# Patient Record
Sex: MALE | Race: WHITE | NOT HISPANIC OR LATINO
[De-identification: names, ages, dates, MRNs, and addresses within clinical notes are randomized per-mention and may not be internally consistent; named-entity substitution may affect disease eponyms.]

---

## 2019-11-26 ENCOUNTER — NON-APPOINTMENT (OUTPATIENT)
Age: 59
End: 2019-11-26

## 2019-11-26 ENCOUNTER — APPOINTMENT (OUTPATIENT)
Dept: HEART AND VASCULAR | Facility: CLINIC | Age: 59
End: 2019-11-26
Payer: COMMERCIAL

## 2019-11-26 ENCOUNTER — RESULT REVIEW (OUTPATIENT)
Age: 59
End: 2019-11-26

## 2019-11-26 VITALS
HEART RATE: 72 BPM | DIASTOLIC BLOOD PRESSURE: 94 MMHG | HEIGHT: 73 IN | SYSTOLIC BLOOD PRESSURE: 168 MMHG | BODY MASS INDEX: 29.82 KG/M2 | RESPIRATION RATE: 20 BRPM | WEIGHT: 225 LBS

## 2019-11-26 DIAGNOSIS — Z80.9 FAMILY HISTORY OF MALIGNANT NEOPLASM, UNSPECIFIED: ICD-10-CM

## 2019-11-26 DIAGNOSIS — Z84.1 FAMILY HISTORY OF DISORDERS OF KIDNEY AND URETER: ICD-10-CM

## 2019-11-26 DIAGNOSIS — E11.9 TYPE 2 DIABETES MELLITUS W/OUT COMPLICATIONS: ICD-10-CM

## 2019-11-26 DIAGNOSIS — Z82.49 FAMILY HISTORY OF ISCHEMIC HEART DISEASE AND OTHER DISEASES OF THE CIRCULATORY SYSTEM: ICD-10-CM

## 2019-11-26 DIAGNOSIS — Z78.9 OTHER SPECIFIED HEALTH STATUS: ICD-10-CM

## 2019-11-26 PROBLEM — Z00.00 ENCOUNTER FOR PREVENTIVE HEALTH EXAMINATION: Status: ACTIVE | Noted: 2019-11-26

## 2019-11-26 PROCEDURE — 0296T: CPT

## 2019-11-26 PROCEDURE — 93351 STRESS TTE COMPLETE: CPT

## 2019-11-26 PROCEDURE — 93320 DOPPLER ECHO COMPLETE: CPT

## 2019-11-26 PROCEDURE — 93000 ELECTROCARDIOGRAM COMPLETE: CPT | Mod: 59

## 2019-11-26 PROCEDURE — 93325 DOPPLER ECHO COLOR FLOW MAPG: CPT

## 2019-11-26 PROCEDURE — 99204 OFFICE O/P NEW MOD 45 MIN: CPT | Mod: 25

## 2019-11-26 PROCEDURE — 0298T: CPT

## 2019-11-26 RX ORDER — AMLODIPINE BESYLATE 10 MG/1
10 TABLET ORAL DAILY
Qty: 90 | Refills: 3 | Status: ACTIVE | COMMUNITY

## 2019-11-26 NOTE — DISCUSSION/SUMMARY
[Hypertension] : hypertension [FreeTextEntry1] : 59 year old M with history of type 2 DM, HTN, HL, right retina vein occlusion, with transient visual loss in left eye - with extensive cotton wool spots. Occasional palpitations. Abnormal EKG. \par - Stress echo with bubble study. \par - 2 week telemetry monitor\par - Start Atorvastatin 40 mg PO daily\par - A1C 9.0 as per labs 2 weeks ago - needs Endocrinologist referral. \par - Spoke with Dr. Mccarthy, and to get Brain MRI as well. \par - BP mildly elevated - asked patient to keep BP log; continue taking Amlodipine 10 mg PO daily and Enalapril 10 mg PO BID. (did not take meds this morning)\par -  Return after testing completed.

## 2019-11-26 NOTE — PHYSICAL EXAM
[General Appearance - Well Developed] : well developed [Normal Appearance] : normal appearance [General Appearance - Well Nourished] : well nourished [Well Groomed] : well groomed [No Deformities] : no deformities [General Appearance - In No Acute Distress] : no acute distress [Normal Oral Mucosa] : normal oral mucosa [No Oral Pallor] : no oral pallor [No Oral Cyanosis] : no oral cyanosis [Heart Rate And Rhythm] : heart rate and rhythm were normal [Heart Sounds] : normal S1 and S2 [Respiration, Rhythm And Depth] : normal respiratory rhythm and effort [Murmurs] : no murmurs present [Exaggerated Use Of Accessory Muscles For Inspiration] : no accessory muscle use [Auscultation Breath Sounds / Voice Sounds] : lungs were clear to auscultation bilaterally [Abdomen Soft] : soft [Abdomen Tenderness] : non-tender [Abdomen Mass (___ Cm)] : no abdominal mass palpated [Nail Clubbing] : no clubbing of the fingernails [Cyanosis, Localized] : no localized cyanosis [] : no ischemic changes [Petechial Hemorrhages (___cm)] : no petechial hemorrhages [Oriented To Time, Place, And Person] : oriented to person, place, and time [Affect] : the affect was normal [Mood] : the mood was normal [No Anxiety] : not feeling anxious

## 2019-11-26 NOTE — REASON FOR VISIT
[Initial Evaluation] : an initial evaluation of [FreeTextEntry1] : 59 year old M with history of type 2 DM, HTN, HL, right retina vein occlusion, with complaints of left eye spots. Seeing Dr. Starr Mccarthy from [facility]. 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 Was fine until recently 2 weeks ago when he started having transient visual loss in the right eye. He went to see Optho yesterday and was found to have extensive cotton wool spots. He was referred here for cardiac evaluation and to rule out embolic phenomenon.  \par \par Labs Oct 11 2019:\par Total chol 186; TG 67; HDL 43; ; TSH 1.140; CBC/CMP WNL; ; A1C 9.0\par \par Strong family history of CAD - father with CABG\par \par Carotid Artery Duplex: No significant disease noted bilaterally. \par \par EKG NSR at 82 bpm with T wave inv in V1, V2, V2, V4-V6.

## 2019-12-04 ENCOUNTER — RECORD ABSTRACTING (OUTPATIENT)
Age: 59
End: 2019-12-04

## 2019-12-04 ENCOUNTER — RESULT REVIEW (OUTPATIENT)
Age: 59
End: 2019-12-04

## 2019-12-04 DIAGNOSIS — G45.9 TRANSIENT CEREBRAL ISCHEMIC ATTACK, UNSPECIFIED: ICD-10-CM

## 2019-12-05 ENCOUNTER — APPOINTMENT (OUTPATIENT)
Dept: HEART AND VASCULAR | Facility: CLINIC | Age: 59
End: 2019-12-05

## 2019-12-31 ENCOUNTER — RX RENEWAL (OUTPATIENT)
Age: 59
End: 2019-12-31

## 2020-01-07 ENCOUNTER — APPOINTMENT (OUTPATIENT)
Dept: HEART AND VASCULAR | Facility: CLINIC | Age: 60
End: 2020-01-07
Payer: COMMERCIAL

## 2020-01-07 VITALS
WEIGHT: 227 LBS | HEIGHT: 73 IN | SYSTOLIC BLOOD PRESSURE: 162 MMHG | HEART RATE: 84 BPM | BODY MASS INDEX: 30.09 KG/M2 | RESPIRATION RATE: 20 BRPM | DIASTOLIC BLOOD PRESSURE: 84 MMHG

## 2020-01-07 PROCEDURE — 99214 OFFICE O/P EST MOD 30 MIN: CPT

## 2020-01-07 RX ORDER — ENALAPRIL MALEATE 10 MG/1
10 TABLET ORAL TWICE DAILY
Refills: 0 | Status: DISCONTINUED | COMMUNITY
End: 2020-01-07

## 2020-01-09 NOTE — DISCUSSION/SUMMARY
[Hypertension] : hypertension [FreeTextEntry1] : 59 year old M with history of type 2 DM, HTN, HL, right retina vein occlusion, with transient visual loss in left eye - with extensive cotton wool spots. Occasional palpitations. Ziopatch 1 week - SR with APC/PVC. Echo normal. Stress normal. Aortic arch plaque. \par \par - On Atorvastatin 40 mg PO daily\par - A1C 9.0 as per labs 2 weeks ago - to see Endocrinologist.\par - seeing Dr. Mccarthy, for Opthalmology  \par - BP  elevated - continue patient to keep BP log; continue taking Amlodipine 10 mg PO daily and  change Enalapril to 20 mg PO once daily. Add Bystolic 5 mg PO daily. \par -  Return in 3 months with BP log

## 2020-01-09 NOTE — REASON FOR VISIT
[Initial Evaluation] : an initial evaluation of [FreeTextEntry1] : 59 year old M with history of type 2 DM, HTN, HL, right retina vein occlusion here for followup after extensive cardiac workup, as per Opthomalogoy. Still with vision problems bilateral. Bp remains elevated at  home - as per patient - BP systolic is 150's-170's mm Hg. No chest pain, dyspnea, orthopnea, PND, edema. \par \par Stress echo: Chuck protocol 9:50 min; No EKG changes. no WMA on post exercise. \par Resting echo: Mild MR; LVEF 60%; PASP 23 mm HG. Trace TR. Borderline cLVH. \par \par MANJU Dec 5 2019 - no thrombus noted; No PFO, negative bubble study. Trace MR/TR/HI. Normal LVEF. \par Ziopatch - 11/26/2109-12/03/2019 - SR with Rare APC/PVC. No arrhythmias\par \par Labs Oct 11 2019:\par Total chol 186; TG 67; HDL 43; ; TSH 1.140; CBC/CMP WNL; ; A1C 9.0\par \par Strong family history of CAD - father with CABG\par \par Carotid Artery Duplex: No significant disease noted bilaterally. \par \par EKG NSR at 82 bpm with T wave inv in V1, V2, V2, V4-V6.

## 2020-03-03 ENCOUNTER — APPOINTMENT (OUTPATIENT)
Dept: HEART AND VASCULAR | Facility: CLINIC | Age: 60
End: 2020-03-03
Payer: COMMERCIAL

## 2020-03-03 PROCEDURE — 93880 EXTRACRANIAL BILAT STUDY: CPT

## 2020-04-07 ENCOUNTER — APPOINTMENT (OUTPATIENT)
Dept: HEART AND VASCULAR | Facility: CLINIC | Age: 60
End: 2020-04-07

## 2020-05-25 ENCOUNTER — LABORATORY RESULT (OUTPATIENT)
Age: 60
End: 2020-05-25

## 2020-05-26 ENCOUNTER — LABORATORY RESULT (OUTPATIENT)
Age: 60
End: 2020-05-26

## 2020-05-26 ENCOUNTER — APPOINTMENT (OUTPATIENT)
Dept: HEART AND VASCULAR | Facility: CLINIC | Age: 60
End: 2020-05-26

## 2020-05-26 ENCOUNTER — APPOINTMENT (OUTPATIENT)
Dept: HEART AND VASCULAR | Facility: CLINIC | Age: 60
End: 2020-05-26
Payer: COMMERCIAL

## 2020-05-26 VITALS
RESPIRATION RATE: 16 BRPM | HEIGHT: 73 IN | OXYGEN SATURATION: 98 % | TEMPERATURE: 97.8 F | DIASTOLIC BLOOD PRESSURE: 70 MMHG | HEART RATE: 84 BPM | SYSTOLIC BLOOD PRESSURE: 116 MMHG | BODY MASS INDEX: 29.42 KG/M2 | WEIGHT: 222 LBS

## 2020-05-26 DIAGNOSIS — Z86.69 PERSONAL HISTORY OF OTHER DISEASES OF THE NERVOUS SYSTEM AND SENSE ORGANS: ICD-10-CM

## 2020-05-26 PROCEDURE — 99215 OFFICE O/P EST HI 40 MIN: CPT

## 2020-05-26 NOTE — DISCUSSION/SUMMARY
[Hyperlipidemia] : hyperlipidemia [Diet Modification] : diet modification [Exercise] : exercise [Hypertension] : hypertension [Improving] : improving [Responding to Treatment] : responding to treatment [Exercise Regimen] : an exercise regimen [Weight Loss] : weight loss [Mild Mitral Regurgitation] : mild mitral regurgitation [Compensated] : compensated [Sodium Restriction] : sodium restriction [Peripheral Vascular Disease] : peripheral vascular disease [Stable] : stable [Exercise Rehab] : exercise rehabilitation [Deteriorating] : deteriorating [None] : none [Patient] : the patient [de-identified] : GREENE [de-identified] : inc CIMT b/l; mild carotid plaque b/l; aorta atherosclerosis

## 2020-05-26 NOTE — REVIEW OF SYSTEMS
[Headache] : no headache [Fever] : no fever [Feeling Fatigued] : not feeling fatigued [Chills] : no chills [Blurry Vision] : blurred vision [Eyeglasses] : not currently wearing eyeglasses [Seeing Double (Diplopia)] : no diplopia [Eye Pain] : eye pain [Negative] : Heme/Lymph

## 2020-05-26 NOTE — REASON FOR VISIT
[Follow-Up - Clinic] : a clinic follow-up of [Hyperlipidemia] : hyperlipidemia [Dyspnea] : dyspnea [Hypertension] : hypertension [Mitral Regurgitation] : mitral regurgitation [Peripheral Vascular Disease] : peripheral vascular disease

## 2020-05-26 NOTE — PHYSICAL EXAM
[Normal Appearance] : normal appearance [General Appearance - Well Developed] : well developed [General Appearance - Well Nourished] : well nourished [Well Groomed] : well groomed [No Deformities] : no deformities [General Appearance - In No Acute Distress] : no acute distress [Eyelids - No Xanthelasma] : the eyelids demonstrated no xanthelasmas [Normal Conjunctiva] : the conjunctiva exhibited no abnormalities [Normal Oral Mucosa] : normal oral mucosa [No Oral Pallor] : no oral pallor [No Oral Cyanosis] : no oral cyanosis [Normal Jugular Venous A Waves Present] : normal jugular venous A waves present [Normal Jugular Venous V Waves Present] : normal jugular venous V waves present [No Jugular Venous Melendez A Waves] : no jugular venous melendez A waves [Respiration, Rhythm And Depth] : normal respiratory rhythm and effort [Auscultation Breath Sounds / Voice Sounds] : lungs were clear to auscultation bilaterally [Exaggerated Use Of Accessory Muscles For Inspiration] : no accessory muscle use [Heart Rate And Rhythm] : heart rate and rhythm were normal [Heart Sounds] : normal S1 and S2 [FreeTextEntry1] : sys murmur [Abdomen Soft] : soft [Abdomen Tenderness] : non-tender [Abdomen Mass (___ Cm)] : no abdominal mass palpated [Abnormal Walk] : normal gait [Nail Clubbing] : no clubbing of the fingernails [Gait - Sufficient For Exercise Testing] : the gait was sufficient for exercise testing [Cyanosis, Localized] : no localized cyanosis [Petechial Hemorrhages (___cm)] : no petechial hemorrhages [Skin Color & Pigmentation] : normal skin color and pigmentation [] : no rash [No Venous Stasis] : no venous stasis [Skin Lesions] : no skin lesions [No Skin Ulcers] : no skin ulcer [No Xanthoma] : no  xanthoma was observed

## 2020-05-26 NOTE — HISTORY OF PRESENT ILLNESS
[FreeTextEntry1] : Jeramie Zambrano returns for follow up.  He has been seen by Dr Parks in the past.  \par \par Today, he denies cp.  He has GREENE since he has not been exercising consistently.  He denies pnd, orthopnea, edema, palp, or loc.\par \par He continues to have visual disturbance in his L eye (improved some post glaucoma surgery).  \par \par Recent eval with endocrinologist completed.\par \par Clinical hx reviewed in detail.\par \par Recommendations:\par 1. blood work to assess inflammatory markers, coagulopathy\par 2. collect all records from several ophtho evals and procedures\par 3. collect all brain imaging studies (CT, MRI/MRA)\par 4. get CTA\par 5. consider more aggressive antiplatelet regimen vs DOAC\par 6. consider loop recorder\par 7. aggressive CV risk interventions

## 2020-05-27 LAB
ANION GAP SERPL CALC-SCNC: 20 MMOL/L
BUN SERPL-MCNC: 20 MG/DL
CALCIUM SERPL-MCNC: 9.8 MG/DL
CHLORIDE SERPL-SCNC: 98 MMOL/L
CO2 SERPL-SCNC: 22 MMOL/L
CREAT SERPL-MCNC: 1.01 MG/DL
CRP SERPL HS-MCNC: 0.74 MG/L
CRP SERPL-MCNC: <0.1 MG/DL
DEPRECATED D DIMER PPP IA-ACNC: <150 NG/ML DDU
GLUCOSE SERPL-MCNC: 151 MG/DL
POTASSIUM SERPL-SCNC: 4.2 MMOL/L
SODIUM SERPL-SCNC: 140 MMOL/L

## 2020-05-28 RX ORDER — ENALAPRIL MALEATE 10 MG/1
10 TABLET ORAL DAILY
Refills: 0 | Status: ACTIVE | COMMUNITY

## 2020-05-28 RX ORDER — ENALAPRIL MALEATE 20 MG/1
20 TABLET ORAL DAILY
Qty: 60 | Refills: 0 | Status: DISCONTINUED | COMMUNITY
Start: 2020-01-07 | End: 2020-05-28

## 2020-05-28 RX ORDER — NEBIVOLOL HYDROCHLORIDE 5 MG/1
5 TABLET ORAL DAILY
Qty: 90 | Refills: 3 | Status: DISCONTINUED | COMMUNITY
Start: 2020-01-07 | End: 2020-05-28

## 2020-05-29 LAB
MYELOPEROXIDASE AB SER QL IA: <5 UNITS
MYELOPEROXIDASE CELLS FLD QL: NEGATIVE

## 2020-05-29 RX ORDER — NETARSUDIL AND LATANOPROST OPHTHALMIC SOLUTION, 0.02%/0.005% .2; .05 MG/ML; MG/ML
0.02-0.005 SOLUTION/ DROPS OPHTHALMIC; TOPICAL
Refills: 0 | Status: ACTIVE | COMMUNITY

## 2020-06-01 ENCOUNTER — RESULT REVIEW (OUTPATIENT)
Age: 60
End: 2020-06-01

## 2021-01-11 ENCOUNTER — RX RENEWAL (OUTPATIENT)
Age: 61
End: 2021-01-11

## 2021-05-07 ENCOUNTER — APPOINTMENT (OUTPATIENT)
Dept: INTERNAL MEDICINE | Facility: CLINIC | Age: 61
End: 2021-05-07
Payer: COMMERCIAL

## 2021-05-07 VITALS
BODY MASS INDEX: 28.89 KG/M2 | OXYGEN SATURATION: 96 % | HEART RATE: 73 BPM | TEMPERATURE: 97.3 F | HEIGHT: 73 IN | DIASTOLIC BLOOD PRESSURE: 100 MMHG | WEIGHT: 218 LBS | SYSTOLIC BLOOD PRESSURE: 150 MMHG

## 2021-05-07 DIAGNOSIS — G47.10 HYPERSOMNIA, UNSPECIFIED: ICD-10-CM

## 2021-05-07 PROCEDURE — 99203 OFFICE O/P NEW LOW 30 MIN: CPT

## 2021-05-07 PROCEDURE — 99072 ADDL SUPL MATRL&STAF TM PHE: CPT

## 2021-05-07 NOTE — PLAN
[FreeTextEntry1] : Patient reassured that his current sleep pattern is not causing deterioration of his visual problems appear

## 2021-05-07 NOTE — HISTORY OF PRESENT ILLNESS
[FreeTextEntry1] : Evaluation of sleep problems. [de-identified] : This is a 61-year-old male with history of diabetes, hypertension, dyslipidemia, diabetic eye disease with retinal vein occlusion. Patient is currently asking me if his sleep problem is in any way affecting his visual problem. 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 He sleeps from 9 AM to 12 no. He then goes back to sleep from 6 PM to 9 PM before going to work. This been going on for 30 years. He feels slightly sleepy in general but he is able to work without any difficulty. On the weekends he sleeps from 9 PM to 5 AM and may feel slightly better on the weekends. There is no snoring or witnessed apneas. He states he does not have sleep apnea. He wants to know if he could sleep more would his visual problem get better.

## 2021-05-24 ENCOUNTER — APPOINTMENT (OUTPATIENT)
Dept: HEART AND VASCULAR | Facility: CLINIC | Age: 61
End: 2021-05-24
Payer: COMMERCIAL

## 2021-05-24 ENCOUNTER — NON-APPOINTMENT (OUTPATIENT)
Age: 61
End: 2021-05-24

## 2021-05-24 VITALS
DIASTOLIC BLOOD PRESSURE: 90 MMHG | RESPIRATION RATE: 16 BRPM | OXYGEN SATURATION: 98 % | HEIGHT: 73 IN | BODY MASS INDEX: 29.82 KG/M2 | WEIGHT: 225 LBS | HEART RATE: 76 BPM | SYSTOLIC BLOOD PRESSURE: 152 MMHG | TEMPERATURE: 97.6 F

## 2021-05-24 PROCEDURE — 93000 ELECTROCARDIOGRAM COMPLETE: CPT

## 2021-05-24 PROCEDURE — 99215 OFFICE O/P EST HI 40 MIN: CPT

## 2021-05-24 PROCEDURE — 99072 ADDL SUPL MATRL&STAF TM PHE: CPT

## 2021-05-24 RX ORDER — DORZOLAMIDE HYDROCHLORIDE 20 MG/ML
SOLUTION OPHTHALMIC
Refills: 0 | Status: ACTIVE | COMMUNITY

## 2021-05-24 RX ORDER — HYDROCHLOROTHIAZIDE 25 MG/1
25 TABLET ORAL DAILY
Qty: 90 | Refills: 3 | Status: DISCONTINUED | COMMUNITY
End: 2021-05-24

## 2021-05-24 RX ORDER — METOPROLOL SUCCINATE 100 MG/1
100 TABLET, EXTENDED RELEASE ORAL
Qty: 2 | Refills: 0 | Status: DISCONTINUED | COMMUNITY
Start: 2020-05-28 | End: 2021-05-24

## 2021-05-24 RX ORDER — BRIMONIDINE TARTRATE, TIMOLOL MALEATE 2; 5 MG/ML; MG/ML
0.2-0.5 SOLUTION/ DROPS OPHTHALMIC
Refills: 0 | Status: DISCONTINUED | COMMUNITY
End: 2021-05-24

## 2021-05-24 RX ORDER — PREDNISOLONE ACETATE 10 MG/ML
1 SUSPENSION/ DROPS OPHTHALMIC
Refills: 0 | Status: DISCONTINUED | COMMUNITY
End: 2021-05-24

## 2021-05-24 NOTE — PHYSICAL EXAM
[General Appearance - Well Developed] : well developed [Normal Appearance] : normal appearance [Well Groomed] : well groomed [General Appearance - Well Nourished] : well nourished [No Deformities] : no deformities [General Appearance - In No Acute Distress] : no acute distress [Normal Conjunctiva] : the conjunctiva exhibited no abnormalities [Eyelids - No Xanthelasma] : the eyelids demonstrated no xanthelasmas [Normal Oral Mucosa] : normal oral mucosa [No Oral Pallor] : no oral pallor [No Oral Cyanosis] : no oral cyanosis [Normal Jugular Venous A Waves Present] : normal jugular venous A waves present [Normal Jugular Venous V Waves Present] : normal jugular venous V waves present [No Jugular Venous Melendez A Waves] : no jugular venous melendez A waves [Respiration, Rhythm And Depth] : normal respiratory rhythm and effort [Exaggerated Use Of Accessory Muscles For Inspiration] : no accessory muscle use [Auscultation Breath Sounds / Voice Sounds] : lungs were clear to auscultation bilaterally [Heart Rate And Rhythm] : heart rate and rhythm were normal [Heart Sounds] : normal S1 and S2 [Abdomen Soft] : soft [Abdomen Tenderness] : non-tender [Abdomen Mass (___ Cm)] : no abdominal mass palpated [Abnormal Walk] : normal gait [Gait - Sufficient For Exercise Testing] : the gait was sufficient for exercise testing [Nail Clubbing] : no clubbing of the fingernails [Cyanosis, Localized] : no localized cyanosis [Petechial Hemorrhages (___cm)] : no petechial hemorrhages [Skin Color & Pigmentation] : normal skin color and pigmentation [] : no rash [No Venous Stasis] : no venous stasis [Skin Lesions] : no skin lesions [No Skin Ulcers] : no skin ulcer [No Xanthoma] : no  xanthoma was observed [FreeTextEntry1] : sys murmur

## 2021-05-24 NOTE — HISTORY OF PRESENT ILLNESS
[FreeTextEntry1] : Jeramie Zambrano returns for follow up.  He has been seen by Dr Parks in the past.  \par \par Today, he denies cp, sob, pnd, orthopnea, edema, palp, or loc.\par \par He continues to have visual disturbance in his L eye (improved some post glaucoma surgery).  \par \par Endocrinology eval is ongoing.  Pulm eval noted.  \par \par ECG today reveals NSR, KT, ST T changes, early repolarization, consider anterolateral ischemia (unchanged)\par \par CTA 6/2020: min RCA disease; LAD bridge\par Carotid Duplex 3/2020: min disease LICA\par MANJU 12/2019: aorta atherosclerosis\par ZIO 12/2019: APC/PVC\par EXSE 11/2019: nl lv sys fxn; nl ngyuen fxn; mild MR; 9:50  min harlan; no ischemia\par \par Clinical hx and results reviewed in detail.\par \par Recommendations:\par 1. continue CV meds; avoid salt and inc po water intake\par 2. collect all records, eg ophtho, endocrine, primary care\par 3. check BP machine accuracy\par 4. EXSE\par

## 2021-05-24 NOTE — REASON FOR VISIT
[Structural Heart and Valve Disease] : structural heart and valve disease [Coronary Artery Disease] : coronary artery disease [Carotid, Aortic and Peripheral Vascular Disease] : carotid, aortic and peripheral vascular disease [Follow-Up - Clinic] : a clinic follow-up of [Dyspnea] : dyspnea [Hyperlipidemia] : hyperlipidemia [Hypertension] : hypertension [Mitral Regurgitation] : mitral regurgitation [Peripheral Vascular Disease] : peripheral vascular disease [Arrhythmia/ECG Abnorrmalities] : arrhythmia/ECG abnormalities [FreeTextEntry3] : Eugenio Kang

## 2021-05-24 NOTE — DISCUSSION/SUMMARY
[Coronary Artery Disease] : coronary artery disease [Weight Reduction] : weight reduction [Hyperlipidemia] : hyperlipidemia [Diet Modification] : diet modification [Exercise] : exercise [Hypertension] : hypertension [Deteriorating] : deteriorating [Exercise Regimen] : an exercise regimen [Dietary Modification] : dietary modification [Weight Loss] : weight loss [Low Sodium Diet] : low sodium diet [Mitral Regurgitation] : mitral regurgitation [Compensated] : compensated [Sodium Restriction] : sodium restriction [Peripheral Vascular Disease] : peripheral vascular disease [Exercise Rehab] : exercise rehabilitation [ECG Normal Variant] : ECG normal variant [Myocardial Ischemia] : myocardial ischemia [Stable] : stable [None] : There are no changes in medication management [Patient] : the patient [de-identified] : min RCA  disease and LAD bridge by CTa 6/2020 [de-identified] : mild by EXSE 11/2019; min by MANJU 12/2019 [de-identified] : inc CIMT b/l; mild carotid plaque b/l; aorta atherosclerosis

## 2021-06-22 ENCOUNTER — APPOINTMENT (OUTPATIENT)
Dept: HEART AND VASCULAR | Facility: CLINIC | Age: 61
End: 2021-06-22
Payer: COMMERCIAL

## 2021-06-22 VITALS
HEIGHT: 73 IN | TEMPERATURE: 97.1 F | HEART RATE: 87 BPM | SYSTOLIC BLOOD PRESSURE: 172 MMHG | WEIGHT: 220 LBS | DIASTOLIC BLOOD PRESSURE: 88 MMHG | OXYGEN SATURATION: 98 % | BODY MASS INDEX: 29.16 KG/M2

## 2021-06-22 PROCEDURE — 99072 ADDL SUPL MATRL&STAF TM PHE: CPT

## 2021-06-22 PROCEDURE — 93320 DOPPLER ECHO COMPLETE: CPT

## 2021-06-22 PROCEDURE — 93351 STRESS TTE COMPLETE: CPT

## 2021-06-22 PROCEDURE — 93325 DOPPLER ECHO COLOR FLOW MAPG: CPT

## 2021-06-23 ENCOUNTER — NON-APPOINTMENT (OUTPATIENT)
Age: 61
End: 2021-06-23

## 2021-06-25 ENCOUNTER — APPOINTMENT (OUTPATIENT)
Dept: HEART AND VASCULAR | Facility: CLINIC | Age: 61
End: 2021-06-25
Payer: COMMERCIAL

## 2021-06-25 VITALS
OXYGEN SATURATION: 98 % | WEIGHT: 223 LBS | TEMPERATURE: 97 F | SYSTOLIC BLOOD PRESSURE: 146 MMHG | DIASTOLIC BLOOD PRESSURE: 80 MMHG | BODY MASS INDEX: 29.55 KG/M2 | HEIGHT: 73 IN | RESPIRATION RATE: 16 BRPM | HEART RATE: 84 BPM

## 2021-06-25 VITALS — SYSTOLIC BLOOD PRESSURE: 146 MMHG | DIASTOLIC BLOOD PRESSURE: 88 MMHG

## 2021-06-25 DIAGNOSIS — Z86.010 PERSONAL HISTORY OF COLONIC POLYPS: ICD-10-CM

## 2021-06-25 PROCEDURE — 99215 OFFICE O/P EST HI 40 MIN: CPT

## 2021-06-25 PROCEDURE — 99072 ADDL SUPL MATRL&STAF TM PHE: CPT

## 2021-06-25 NOTE — REVIEW OF SYSTEMS
[Blurry Vision] : blurred vision [Seeing Double (Diplopia)] : no diplopia [Eye Pain] : no eye pain [Negative] : Psychiatric

## 2021-06-25 NOTE — REASON FOR VISIT
[Arrhythmia/ECG Abnorrmalities] : arrhythmia/ECG abnormalities [Structural Heart and Valve Disease] : structural heart and valve disease [Coronary Artery Disease] : coronary artery disease [Carotid, Aortic and Peripheral Vascular Disease] : carotid, aortic and peripheral vascular disease [FreeTextEntry3] : Eugenio Kang [Follow-Up - Clinic] : a clinic follow-up of [Hyperlipidemia] : hyperlipidemia [Dyspnea] : dyspnea [Hypertension] : hypertension [Mitral Regurgitation] : mitral regurgitation [Peripheral Vascular Disease] : peripheral vascular disease

## 2021-06-25 NOTE — HISTORY OF PRESENT ILLNESS
[FreeTextEntry1] : Jeramie Zambrano returns for follow up.  He has been seen by Dr Parks in the past.  \par \par Today, he denies cp, sob, pnd, orthopnea, edema, palp, or loc.\par \par He continues to have visual disturbance in his L eye (improved some post glaucoma surgery).  \par \par Endocrinology eval is ongoing.  Pulm eval noted.  \par \par ECG today reveals NSR, TK, ST T changes, early repolarization, consider anterolateral ischemia (unchanged)\par \par CTA 6/2020: min RCA disease; LAD bridge\par Carotid Duplex 3/2020: min disease LICA\par MANJU 12/2019: aorta atherosclerosis\par ZIO 12/2019: APC/PVC\par EXSE 11/2019: nl lv sys fxn; nl nguyen fxn; mild MR; 9:50  min Chuck; no ischemia\par EXSE 6/2021: nl lv sys fxn; nl nguyen fxn; LVH; min MR; 7:30  min Chuck; no ischemia\par \par Clinical hx and results reviewed in detail.\par \par Recommendations:\par 1. continue CV meds; avoid salt and inc po water intake\par 2. collect all records, eg ophtho, endocrine, primary care, GI\par 3. check BP machine accuracy - done - off by 8-10 points\par 4. exercise\par 5. f/u in 4-5 months\par

## 2021-06-25 NOTE — PHYSICAL EXAM
[Normal Appearance] : normal appearance [General Appearance - Well Developed] : well developed [Well Groomed] : well groomed [General Appearance - Well Nourished] : well nourished [No Deformities] : no deformities [General Appearance - In No Acute Distress] : no acute distress [Normal Conjunctiva] : the conjunctiva exhibited no abnormalities [Normal Oral Mucosa] : normal oral mucosa [Eyelids - No Xanthelasma] : the eyelids demonstrated no xanthelasmas [No Oral Pallor] : no oral pallor [No Oral Cyanosis] : no oral cyanosis [Normal Jugular Venous A Waves Present] : normal jugular venous A waves present [Normal Jugular Venous V Waves Present] : normal jugular venous V waves present [No Jugular Venous Melendez A Waves] : no jugular venous melendez A waves [Respiration, Rhythm And Depth] : normal respiratory rhythm and effort [Exaggerated Use Of Accessory Muscles For Inspiration] : no accessory muscle use [Auscultation Breath Sounds / Voice Sounds] : lungs were clear to auscultation bilaterally [Heart Rate And Rhythm] : heart rate and rhythm were normal [Heart Sounds] : normal S1 and S2 [FreeTextEntry1] : sys murmur [Abdomen Tenderness] : non-tender [Abdomen Soft] : soft [Abdomen Mass (___ Cm)] : no abdominal mass palpated [Abnormal Walk] : normal gait [Gait - Sufficient For Exercise Testing] : the gait was sufficient for exercise testing [Nail Clubbing] : no clubbing of the fingernails [Cyanosis, Localized] : no localized cyanosis [Petechial Hemorrhages (___cm)] : no petechial hemorrhages [Skin Color & Pigmentation] : normal skin color and pigmentation [] : no rash [No Venous Stasis] : no venous stasis [Skin Lesions] : no skin lesions [No Skin Ulcers] : no skin ulcer [No Xanthoma] : no  xanthoma was observed

## 2021-06-25 NOTE — DISCUSSION/SUMMARY
[ECG Normal Variant] : ECG normal variant [Myocardial Ischemia] : myocardial ischemia [Coronary Artery Disease] : coronary artery disease [Weight Reduction] : weight reduction [Hyperlipidemia] : hyperlipidemia [Diet Modification] : diet modification [Exercise] : exercise [Hypertension] : hypertension [Exercise Regimen] : an exercise regimen [Dietary Modification] : dietary modification [Weight Loss] : weight loss [Low Sodium Diet] : low sodium diet [Mitral Regurgitation] : mitral regurgitation [Compensated] : compensated [Sodium Restriction] : sodium restriction [Peripheral Vascular Disease] : peripheral vascular disease [Stable] : stable [None] : There are no changes in medication management [Exercise Rehab] : exercise rehabilitation [Patient] : the patient [de-identified] : inc CIMT b/l; mild carotid plaque b/l; aorta atherosclerosis

## 2022-01-18 ENCOUNTER — RX RENEWAL (OUTPATIENT)
Age: 62
End: 2022-01-18

## 2022-05-07 NOTE — ASSESSMENT
[FreeTextEntry1] : Patient with mild excess sleepiness with an Chatham score of 10. However patient usually only sleeps for 6 hours total broken up in 2-3 hour period. I've explained to the patient that most people require 7 hours of sleep. He has had this same sleep schedule for 30 years. He does not know how he can increase his sleep time. I have explained to him that increasing his sleep time would not affect  his visual problem. There is nothing to suggest this  patient has significant sleep apnea. I have reassured the patient.
Normal

## 2022-06-03 ENCOUNTER — NON-APPOINTMENT (OUTPATIENT)
Age: 62
End: 2022-06-03

## 2022-06-03 ENCOUNTER — APPOINTMENT (OUTPATIENT)
Dept: HEART AND VASCULAR | Facility: CLINIC | Age: 62
End: 2022-06-03
Payer: COMMERCIAL

## 2022-06-03 VITALS
OXYGEN SATURATION: 97 % | DIASTOLIC BLOOD PRESSURE: 80 MMHG | TEMPERATURE: 97.4 F | WEIGHT: 220 LBS | HEIGHT: 73 IN | HEART RATE: 79 BPM | SYSTOLIC BLOOD PRESSURE: 132 MMHG | BODY MASS INDEX: 29.16 KG/M2 | RESPIRATION RATE: 16 BRPM

## 2022-06-03 DIAGNOSIS — E78.5 HYPERLIPIDEMIA, UNSPECIFIED: ICD-10-CM

## 2022-06-03 DIAGNOSIS — I51.7 CARDIOMEGALY: ICD-10-CM

## 2022-06-03 DIAGNOSIS — I70.0 ATHEROSCLEROSIS OF AORTA: ICD-10-CM

## 2022-06-03 DIAGNOSIS — I65.29 OCCLUSION AND STENOSIS OF UNSPECIFIED CAROTID ARTERY: ICD-10-CM

## 2022-06-03 DIAGNOSIS — I10 ESSENTIAL (PRIMARY) HYPERTENSION: ICD-10-CM

## 2022-06-03 DIAGNOSIS — H34.8312 TRIBUTARY (BRANCH) RETINAL VEIN OCCLUSION, RIGHT EYE, STABLE: ICD-10-CM

## 2022-06-03 DIAGNOSIS — I34.0 NONRHEUMATIC MITRAL (VALVE) INSUFFICIENCY: ICD-10-CM

## 2022-06-03 DIAGNOSIS — R94.31 ABNORMAL ELECTROCARDIOGRAM [ECG] [EKG]: ICD-10-CM

## 2022-06-03 DIAGNOSIS — I25.10 ATHEROSCLEROTIC HEART DISEASE OF NATIVE CORONARY ARTERY W/OUT ANGINA PECTORIS: ICD-10-CM

## 2022-06-03 DIAGNOSIS — Q24.5 MALFORMATION OF CORONARY VESSELS: ICD-10-CM

## 2022-06-03 PROCEDURE — 93000 ELECTROCARDIOGRAM COMPLETE: CPT

## 2022-06-03 PROCEDURE — 99215 OFFICE O/P EST HI 40 MIN: CPT

## 2022-06-03 RX ORDER — ASPIRIN 81 MG/1
81 TABLET ORAL DAILY
Qty: 90 | Refills: 3 | Status: DISCONTINUED | COMMUNITY
Start: 2020-01-07 | End: 2022-06-03

## 2022-06-03 RX ORDER — ASPIRIN 81 MG/1
81 TABLET, COATED ORAL
Qty: 90 | Refills: 3 | Status: DISCONTINUED | COMMUNITY
Start: 2022-01-18 | End: 2022-06-03

## 2022-06-03 RX ORDER — ATORVASTATIN CALCIUM 40 MG/1
40 TABLET, FILM COATED ORAL
Qty: 90 | Refills: 3 | Status: DISCONTINUED | COMMUNITY
Start: 2019-11-26 | End: 2022-06-03

## 2022-06-06 ENCOUNTER — NON-APPOINTMENT (OUTPATIENT)
Age: 62
End: 2022-06-06

## 2022-06-06 PROBLEM — I70.0 ATHEROSCLEROSIS OF AORTA: Status: ACTIVE | Noted: 2020-05-26

## 2022-06-06 PROBLEM — R94.31 ABNORMAL ELECTROCARDIOGRAM: Status: ACTIVE | Noted: 2021-05-24

## 2022-06-06 PROBLEM — I34.0 MILD MITRAL REGURGITATION: Status: ACTIVE | Noted: 2020-05-26

## 2022-06-06 PROBLEM — Q24.5 MYOCARDIAL BRIDGE: Status: ACTIVE | Noted: 2021-05-24

## 2022-06-06 PROBLEM — I25.10 ATHEROSCLEROSIS OF CORONARY ARTERY: Status: ACTIVE | Noted: 2021-05-24

## 2022-06-06 PROBLEM — I65.29 CAROTID ARTERY PLAQUE: Status: ACTIVE | Noted: 2020-05-26

## 2022-06-06 PROBLEM — I10 HTN (HYPERTENSION): Status: ACTIVE | Noted: 2019-11-26

## 2022-06-06 PROBLEM — I51.7 LEFT VENTRICULAR HYPERTROPHY: Status: ACTIVE | Noted: 2021-06-25

## 2022-06-06 PROBLEM — E78.5 DYSLIPIDEMIA: Status: ACTIVE | Noted: 2019-11-26

## 2022-06-06 PROBLEM — H34.8312 BRANCH RETINAL VEIN OCCLUSION, RIGHT EYE: Status: ACTIVE | Noted: 2019-11-26

## 2022-06-06 RX ORDER — HYDROCHLOROTHIAZIDE 12.5 MG/1
12.5 CAPSULE ORAL
Refills: 0 | Status: ACTIVE | COMMUNITY

## 2022-06-06 RX ORDER — BRIMONIDINE TARTRATE 2 MG/MG
0.2 SOLUTION/ DROPS OPHTHALMIC
Qty: 5 | Refills: 0 | Status: ACTIVE | COMMUNITY
Start: 2021-11-08

## 2022-06-06 RX ORDER — ASPIRIN 81 MG/1
81 TABLET ORAL
Refills: 0 | Status: ACTIVE | COMMUNITY

## 2022-06-06 NOTE — REVIEW OF SYSTEMS
[Blurry Vision] : blurred vision [Seeing Double (Diplopia)] : no diplopia [Eye Pain] : no eye pain [Negative] : Heme/Lymph

## 2022-06-06 NOTE — HISTORY OF PRESENT ILLNESS
[FreeTextEntry1] : Jeramie Zambrano returns for follow up.  He has been seen by Dr Parks in the past.  \par \par Today, he denies cp, sob, pnd, orthopnea, edema, palp, or loc.\par \par He continues to have visual disturbance in his L eye (improved some post glaucoma surgery).  \par \par ECG today reveals NSR, ST T changes, early repolarization, consider anterolateral ischemia (unchanged)\par \par CTA 6/2020: min RCA disease; LAD bridge\par Carotid Duplex 3/2020: min disease LICA\par MANJU 12/2019: aorta atherosclerosis\par ZIO 12/2019: APC/PVC\par EXSE 11/2019: nl lv sys fxn; nl nguyen fxn; mild MR; 9:50  min Chuck; no ischemia\par EXSE 6/2021: nl lv sys fxn; nl nguyen fxn; LVH; min MR; 7:30  min Chuck; no ischemia\par \par Clinical hx  reviewed in detail.\par \par Recommendations:\par 1. continue CV meds; avoid salt and inc po water intake\par 2. collect all records, eg ophtho, endocrine, primary care, GI\par 3. dietary counseling\par 4. exercise\par 5. EXSE/CPET\par

## 2022-06-06 NOTE — REASON FOR VISIT
[Arrhythmia/ECG Abnorrmalities] : arrhythmia/ECG abnormalities [Structural Heart and Valve Disease] : structural heart and valve disease [Hyperlipidemia] : hyperlipidemia [Hypertension] : hypertension [Coronary Artery Disease] : coronary artery disease [Carotid, Aortic and Peripheral Vascular Disease] : carotid, aortic and peripheral vascular disease [FreeTextEntry3] : Eugenio Kang

## 2022-06-06 NOTE — DISCUSSION/SUMMARY
[ECG Normal Variant] : ECG normal variant [Myocardial Ischemia] : myocardial ischemia [Coronary Artery Disease] : coronary artery disease [Weight Reduction] : weight reduction [Hyperlipidemia] : hyperlipidemia [Diet Modification] : diet modification [Exercise] : exercise [Hypertension] : hypertension [Exercise Regimen] : an exercise regimen [Dietary Modification] : dietary modification [Weight Loss] : weight loss [Low Sodium Diet] : low sodium diet [Mitral Regurgitation] : mitral regurgitation [Compensated] : compensated [Sodium Restriction] : sodium restriction [Peripheral Vascular Disease] : peripheral vascular disease [Stable] : stable [None] : There are no changes in medication management [Exercise Rehab] : exercise rehabilitation [Patient] : the patient [de-identified] : inc CIMT b/l; mild carotid plaque b/l; aorta atherosclerosis

## 2022-06-06 NOTE — PHYSICAL EXAM
[General Appearance - Well Developed] : well developed [Normal Appearance] : normal appearance [Well Groomed] : well groomed [General Appearance - Well Nourished] : well nourished [No Deformities] : no deformities [General Appearance - In No Acute Distress] : no acute distress [Normal Conjunctiva] : the conjunctiva exhibited no abnormalities [Eyelids - No Xanthelasma] : the eyelids demonstrated no xanthelasmas [Normal Oral Mucosa] : normal oral mucosa [No Oral Pallor] : no oral pallor [No Oral Cyanosis] : no oral cyanosis [Normal Jugular Venous A Waves Present] : normal jugular venous A waves present [Normal Jugular Venous V Waves Present] : normal jugular venous V waves present [No Jugular Venous Melendez A Waves] : no jugular venous melendez A waves [Respiration, Rhythm And Depth] : normal respiratory rhythm and effort [Exaggerated Use Of Accessory Muscles For Inspiration] : no accessory muscle use [Auscultation Breath Sounds / Voice Sounds] : lungs were clear to auscultation bilaterally [Heart Rate And Rhythm] : heart rate and rhythm were normal [Heart Sounds] : normal S1 and S2 [FreeTextEntry1] : sys murmur [Abdomen Soft] : soft [Abdomen Tenderness] : non-tender [Abdomen Mass (___ Cm)] : no abdominal mass palpated [Abnormal Walk] : normal gait [Gait - Sufficient For Exercise Testing] : the gait was sufficient for exercise testing [Nail Clubbing] : no clubbing of the fingernails [Cyanosis, Localized] : no localized cyanosis [Petechial Hemorrhages (___cm)] : no petechial hemorrhages [Skin Color & Pigmentation] : normal skin color and pigmentation [] : no rash [No Venous Stasis] : no venous stasis [Skin Lesions] : no skin lesions [No Skin Ulcers] : no skin ulcer [No Xanthoma] : no  xanthoma was observed

## 2022-11-11 ENCOUNTER — OFFICE (OUTPATIENT)
Dept: URBAN - METROPOLITAN AREA CLINIC 122 | Facility: CLINIC | Age: 62
Setting detail: OPHTHALMOLOGY
End: 2022-11-11
Payer: COMMERCIAL

## 2022-11-11 DIAGNOSIS — Z96.1: ICD-10-CM

## 2022-11-11 PROCEDURE — 99024 POSTOP FOLLOW-UP VISIT: CPT | Performed by: OPHTHALMOLOGY

## 2022-11-11 ASSESSMENT — REFRACTION_MANIFEST
OD_CYLINDER: SPH
OS_CYLINDER: SPH
OS_SPHERE: PLANO
OD_SPHERE: -1.00
OD_VA1: 20/20
OD_ADD: +2.75
OD_CYLINDER: +1.00
OS_VA1: 20/150
OD_AXIS: 165
OS_ADD: +2.75
OS_SPHERE: PLANO
OS_CYLINDER: SPH
OD_SPHERE: PLANO
OS_VA1: 20/400
OD_VA1: 20/30+2

## 2022-11-11 ASSESSMENT — SPHEQUIV_DERIVED
OS_SPHEQUIV: 0.375
OD_SPHEQUIV: -0.5
OD_SPHEQUIV: 0.25

## 2022-11-11 ASSESSMENT — VISUAL ACUITY
OS_BCVA: 20/20
OD_BCVA: 20/400

## 2022-11-11 ASSESSMENT — TONOMETRY
OS_IOP_MMHG: 13
OD_IOP_MMHG: 12
OD_IOP_MMHG: 13
OS_IOP_MMHG: 14

## 2022-11-11 ASSESSMENT — REFRACTION_AUTOREFRACTION
OD_AXIS: 99
OS_CYLINDER: -0.25
OS_SPHERE: +0.50
OD_SPHERE: +0.50
OD_CYLINDER: -0.50
OS_AXIS: 172

## 2022-11-11 ASSESSMENT — CONFRONTATIONAL VISUAL FIELD TEST (CVF)
OS_FINDINGS: FULL
OD_FINDINGS: FULL

## 2022-11-11 ASSESSMENT — PACHYMETRY
OS_CT_UM: 566
OS_CT_CORRECTION: -1

## 2022-11-11 ASSESSMENT — LID POSITION - PTOSIS: OS_PTOSIS: LUL 1+

## 2022-12-02 ENCOUNTER — RX ONLY (RX ONLY)
Age: 62
End: 2022-12-02

## 2022-12-02 ENCOUNTER — OFFICE (OUTPATIENT)
Dept: URBAN - METROPOLITAN AREA CLINIC 122 | Facility: CLINIC | Age: 62
Setting detail: OPHTHALMOLOGY
End: 2022-12-02
Payer: COMMERCIAL

## 2022-12-02 DIAGNOSIS — H40.89: ICD-10-CM

## 2022-12-02 DIAGNOSIS — H16.223: ICD-10-CM

## 2022-12-02 DIAGNOSIS — Z96.1: ICD-10-CM

## 2022-12-02 PROCEDURE — 99024 POSTOP FOLLOW-UP VISIT: CPT | Performed by: OPHTHALMOLOGY

## 2022-12-02 ASSESSMENT — REFRACTION_MANIFEST
OS_SPHERE: PLANO
OD_VA1: 20/20
OS_VA1: 20/150
OD_CYLINDER: SPH
OS_CYLINDER: SPH
OD_ADD: +2.75
OD_SPHERE: PLANO
OS_SPHERE: PLANO
OD_AXIS: 165
OS_VA1: 20/400
OD_CYLINDER: +1.00
OD_VA1: 20/30+2
OD_SPHERE: -1.00
OS_ADD: +2.75
OS_CYLINDER: SPH

## 2022-12-02 ASSESSMENT — SPHEQUIV_DERIVED
OS_SPHEQUIV: 0.375
OD_SPHEQUIV: -0.5
OD_SPHEQUIV: 0.25

## 2022-12-02 ASSESSMENT — TEAR BREAK UP TIME (TBUT)
OD_TBUT: 2+
OS_TBUT: 2+

## 2022-12-02 ASSESSMENT — LID POSITION - PTOSIS: OS_PTOSIS: LUL 1+

## 2022-12-02 ASSESSMENT — VISUAL ACUITY
OD_BCVA: 20/400
OS_BCVA: 20/20-2

## 2022-12-02 ASSESSMENT — PACHYMETRY
OS_CT_CORRECTION: -1
OS_CT_UM: 566

## 2022-12-02 ASSESSMENT — REFRACTION_AUTOREFRACTION
OS_CYLINDER: -0.25
OS_SPHERE: +0.50
OD_AXIS: 99
OD_SPHERE: +0.50
OD_CYLINDER: -0.50
OS_AXIS: 172

## 2022-12-02 ASSESSMENT — TONOMETRY
OS_IOP_MMHG: 11
OD_IOP_MMHG: 12

## 2023-01-31 ENCOUNTER — OFFICE (OUTPATIENT)
Dept: URBAN - METROPOLITAN AREA CLINIC 122 | Facility: CLINIC | Age: 63
Setting detail: OPHTHALMOLOGY
End: 2023-01-31
Payer: COMMERCIAL

## 2023-01-31 DIAGNOSIS — E11.3293: ICD-10-CM

## 2023-01-31 DIAGNOSIS — Z96.1: ICD-10-CM

## 2023-01-31 DIAGNOSIS — H40.89: ICD-10-CM

## 2023-01-31 DIAGNOSIS — H26.492: ICD-10-CM

## 2023-01-31 DIAGNOSIS — E11.9: ICD-10-CM

## 2023-01-31 DIAGNOSIS — H16.223: ICD-10-CM

## 2023-01-31 PROCEDURE — 92250 FUNDUS PHOTOGRAPHY W/I&R: CPT | Performed by: OPHTHALMOLOGY

## 2023-01-31 PROCEDURE — 92012 INTRM OPH EXAM EST PATIENT: CPT | Performed by: OPHTHALMOLOGY

## 2023-01-31 ASSESSMENT — REFRACTION_AUTOREFRACTION
OS_SPHERE: +0.50
OD_AXIS: 99
OD_CYLINDER: -0.50
OD_SPHERE: +0.50
OS_CYLINDER: -0.25
OS_AXIS: 172

## 2023-01-31 ASSESSMENT — LID POSITION - PTOSIS: OS_PTOSIS: LUL 1+

## 2023-01-31 ASSESSMENT — PACHYMETRY
OS_CT_UM: 566
OS_CT_CORRECTION: -1

## 2023-01-31 ASSESSMENT — REFRACTION_MANIFEST
OS_VA1: 20/400
OD_VA1: 20/30+2
OD_CYLINDER: SPH
OS_CYLINDER: SPH
OD_CYLINDER: +1.00
OS_ADD: +2.75
OS_SPHERE: PLANO
OS_CYLINDER: SPH
OS_VA1: 20/150
OD_AXIS: 165
OD_SPHERE: -1.00
OS_SPHERE: PLANO
OD_ADD: +2.75
OD_SPHERE: PLANO
OD_VA1: 20/20

## 2023-01-31 ASSESSMENT — TEAR BREAK UP TIME (TBUT)
OS_TBUT: 2+
OD_TBUT: 2+

## 2023-01-31 ASSESSMENT — SPHEQUIV_DERIVED
OS_SPHEQUIV: 0.375
OD_SPHEQUIV: -0.5
OD_SPHEQUIV: 0.25

## 2023-01-31 ASSESSMENT — TONOMETRY: OS_IOP_MMHG: 12

## 2023-02-03 ENCOUNTER — RX ONLY (RX ONLY)
Age: 63
End: 2023-02-03

## 2023-02-03 ENCOUNTER — OFFICE (OUTPATIENT)
Dept: URBAN - METROPOLITAN AREA CLINIC 122 | Facility: CLINIC | Age: 63
Setting detail: OPHTHALMOLOGY
End: 2023-02-03
Payer: COMMERCIAL

## 2023-02-03 DIAGNOSIS — H16.223: ICD-10-CM

## 2023-02-03 DIAGNOSIS — H40.89: ICD-10-CM

## 2023-02-03 DIAGNOSIS — H26.492: ICD-10-CM

## 2023-02-03 DIAGNOSIS — E11.9: ICD-10-CM

## 2023-02-03 DIAGNOSIS — E11.3293: ICD-10-CM

## 2023-02-03 PROCEDURE — 92012 INTRM OPH EXAM EST PATIENT: CPT | Performed by: OPHTHALMOLOGY

## 2023-02-03 PROCEDURE — 65800 DRAINAGE OF EYE: CPT | Performed by: OPHTHALMOLOGY

## 2023-02-03 ASSESSMENT — REFRACTION_MANIFEST
OS_SPHERE: PLANO
OS_SPHERE: PLANO
OS_CYLINDER: SPH
OS_VA1: 20/150
OD_CYLINDER: SPH
OD_VA1: 20/30+2
OD_AXIS: 165
OS_CYLINDER: SPH
OD_SPHERE: -1.00
OS_ADD: +2.75
OS_VA1: 20/400
OD_VA1: 20/20
OD_CYLINDER: +1.00
OD_SPHERE: PLANO
OD_ADD: +2.75

## 2023-02-03 ASSESSMENT — REFRACTION_AUTOREFRACTION
OD_AXIS: 99
OS_CYLINDER: -0.25
OS_AXIS: 172
OS_SPHERE: +0.50
OD_SPHERE: +0.50
OD_CYLINDER: -0.50

## 2023-02-03 ASSESSMENT — TEAR BREAK UP TIME (TBUT)
OD_TBUT: 2+
OS_TBUT: 2+

## 2023-02-03 ASSESSMENT — CONFRONTATIONAL VISUAL FIELD TEST (CVF)
OD_FINDINGS: FULL
OS_FINDINGS: FULL

## 2023-02-03 ASSESSMENT — PACHYMETRY
OS_CT_CORRECTION: -1
OS_CT_UM: 566

## 2023-02-03 ASSESSMENT — SPHEQUIV_DERIVED
OD_SPHEQUIV: -0.5
OS_SPHEQUIV: 0.375
OD_SPHEQUIV: 0.25

## 2023-02-03 ASSESSMENT — LID POSITION - PTOSIS: OS_PTOSIS: LUL 1+

## 2023-02-03 ASSESSMENT — VISUAL ACUITY
OS_BCVA: 20/25-
OD_BCVA: 20/400

## 2023-02-06 ENCOUNTER — OFFICE (OUTPATIENT)
Dept: URBAN - METROPOLITAN AREA CLINIC 122 | Facility: CLINIC | Age: 63
Setting detail: OPHTHALMOLOGY
End: 2023-02-06
Payer: COMMERCIAL

## 2023-02-06 DIAGNOSIS — E11.9: ICD-10-CM

## 2023-02-06 DIAGNOSIS — H16.223: ICD-10-CM

## 2023-02-06 DIAGNOSIS — H40.89: ICD-10-CM

## 2023-02-06 DIAGNOSIS — E11.3293: ICD-10-CM

## 2023-02-06 DIAGNOSIS — H26.492: ICD-10-CM

## 2023-02-06 DIAGNOSIS — Z96.1: ICD-10-CM

## 2023-02-06 PROCEDURE — 92012 INTRM OPH EXAM EST PATIENT: CPT | Performed by: OPHTHALMOLOGY

## 2023-02-06 ASSESSMENT — REFRACTION_MANIFEST
OS_VA1: 20/400
OS_SPHERE: PLANO
OS_VA1: 20/150
OD_CYLINDER: +1.00
OS_CYLINDER: SPH
OD_ADD: +2.75
OD_SPHERE: -1.00
OS_SPHERE: PLANO
OD_VA1: 20/30+2
OS_CYLINDER: SPH
OD_VA1: 20/20
OD_SPHERE: PLANO
OD_CYLINDER: SPH
OD_AXIS: 165
OS_ADD: +2.75

## 2023-02-06 ASSESSMENT — PACHYMETRY
OS_CT_UM: 566
OS_CT_CORRECTION: -1

## 2023-02-06 ASSESSMENT — SPHEQUIV_DERIVED
OS_SPHEQUIV: 0.375
OD_SPHEQUIV: 0.25
OD_SPHEQUIV: -0.5

## 2023-02-06 ASSESSMENT — VISUAL ACUITY
OD_BCVA: 20/200
OS_BCVA: 20/25-

## 2023-02-06 ASSESSMENT — REFRACTION_AUTOREFRACTION
OS_SPHERE: +0.50
OS_CYLINDER: -0.25
OS_AXIS: 172
OD_CYLINDER: -0.50
OD_AXIS: 99
OD_SPHERE: +0.50

## 2023-02-06 ASSESSMENT — TEAR BREAK UP TIME (TBUT)
OD_TBUT: 2+
OS_TBUT: 2+

## 2023-02-06 ASSESSMENT — TONOMETRY: OS_IOP_MMHG: 12

## 2023-02-06 ASSESSMENT — LID POSITION - PTOSIS: OS_PTOSIS: LUL 1+

## 2023-02-10 ENCOUNTER — OFFICE (OUTPATIENT)
Dept: URBAN - METROPOLITAN AREA CLINIC 122 | Facility: CLINIC | Age: 63
Setting detail: OPHTHALMOLOGY
End: 2023-02-10
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
## 2023-02-10 PROCEDURE — NO SHOW FE NO SHOW FEE: Performed by: OPHTHALMOLOGY

## 2023-02-13 ENCOUNTER — OFFICE (OUTPATIENT)
Dept: URBAN - METROPOLITAN AREA CLINIC 122 | Facility: CLINIC | Age: 63
Setting detail: OPHTHALMOLOGY
End: 2023-02-13
Payer: COMMERCIAL

## 2023-02-13 ENCOUNTER — RX ONLY (RX ONLY)
Age: 63
End: 2023-02-13

## 2023-02-13 DIAGNOSIS — H16.223: ICD-10-CM

## 2023-02-13 DIAGNOSIS — H26.492: ICD-10-CM

## 2023-02-13 DIAGNOSIS — E11.3293: ICD-10-CM

## 2023-02-13 DIAGNOSIS — E11.9: ICD-10-CM

## 2023-02-13 DIAGNOSIS — Z96.1: ICD-10-CM

## 2023-02-13 DIAGNOSIS — H40.89: ICD-10-CM

## 2023-02-13 PROCEDURE — 92012 INTRM OPH EXAM EST PATIENT: CPT | Performed by: OPHTHALMOLOGY

## 2023-02-13 ASSESSMENT — CONFRONTATIONAL VISUAL FIELD TEST (CVF)
OD_FINDINGS: FULL
OS_FINDINGS: FULL

## 2023-02-13 ASSESSMENT — PACHYMETRY
OS_CT_UM: 566
OS_CT_CORRECTION: -1

## 2023-02-13 ASSESSMENT — LID POSITION - PTOSIS: OS_PTOSIS: LUL 1+

## 2023-02-13 ASSESSMENT — REFRACTION_AUTOREFRACTION
OS_AXIS: 172
OS_SPHERE: +0.50
OS_CYLINDER: -0.25
OD_CYLINDER: -0.50
OD_SPHERE: +0.50
OD_AXIS: 99

## 2023-02-13 ASSESSMENT — REFRACTION_MANIFEST
OD_CYLINDER: SPH
OD_VA1: 20/20
OD_AXIS: 165
OS_SPHERE: PLANO
OD_ADD: +2.75
OS_CYLINDER: SPH
OD_SPHERE: PLANO
OD_SPHERE: -1.00
OS_VA1: 20/400
OD_VA1: 20/30+2
OS_ADD: +2.75
OS_SPHERE: PLANO
OD_CYLINDER: +1.00
OS_VA1: 20/150
OS_CYLINDER: SPH

## 2023-02-13 ASSESSMENT — SPHEQUIV_DERIVED
OS_SPHEQUIV: 0.375
OD_SPHEQUIV: -0.5
OD_SPHEQUIV: 0.25

## 2023-02-13 ASSESSMENT — VISUAL ACUITY
OD_BCVA: 20/200
OS_BCVA: 20/25-

## 2023-02-13 ASSESSMENT — TONOMETRY: OS_IOP_MMHG: 12

## 2023-02-13 ASSESSMENT — TEAR BREAK UP TIME (TBUT)
OS_TBUT: 2+
OD_TBUT: 2+

## 2023-02-24 ENCOUNTER — OFFICE (OUTPATIENT)
Dept: URBAN - METROPOLITAN AREA CLINIC 122 | Facility: CLINIC | Age: 63
Setting detail: OPHTHALMOLOGY
End: 2023-02-24
Payer: COMMERCIAL

## 2023-02-24 DIAGNOSIS — E11.3293: ICD-10-CM

## 2023-02-24 DIAGNOSIS — E11.9: ICD-10-CM

## 2023-02-24 DIAGNOSIS — H26.492: ICD-10-CM

## 2023-02-24 DIAGNOSIS — H16.223: ICD-10-CM

## 2023-02-24 DIAGNOSIS — Z96.1: ICD-10-CM

## 2023-02-24 DIAGNOSIS — H40.89: ICD-10-CM

## 2023-02-24 PROCEDURE — 92012 INTRM OPH EXAM EST PATIENT: CPT | Performed by: OPHTHALMOLOGY

## 2023-02-24 ASSESSMENT — REFRACTION_AUTOREFRACTION
OS_AXIS: 172
OS_CYLINDER: -0.25
OD_SPHERE: +0.50
OD_AXIS: 99
OD_CYLINDER: -0.50
OS_SPHERE: +0.50

## 2023-02-24 ASSESSMENT — REFRACTION_MANIFEST
OD_CYLINDER: +1.00
OS_CYLINDER: SPH
OS_VA1: 20/400
OS_ADD: +2.75
OS_SPHERE: PLANO
OD_SPHERE: -1.00
OS_CYLINDER: SPH
OD_ADD: +2.75
OD_AXIS: 165
OD_CYLINDER: SPH
OS_VA1: 20/150
OS_SPHERE: PLANO
OD_VA1: 20/30+2
OD_SPHERE: PLANO
OD_VA1: 20/20

## 2023-02-24 ASSESSMENT — TEAR BREAK UP TIME (TBUT)
OD_TBUT: 2+
OS_TBUT: 2+

## 2023-02-24 ASSESSMENT — VISUAL ACUITY
OD_BCVA: 20/200
OS_BCVA: 20/20

## 2023-02-24 ASSESSMENT — SPHEQUIV_DERIVED
OD_SPHEQUIV: 0.25
OD_SPHEQUIV: -0.5
OS_SPHEQUIV: 0.375

## 2023-02-24 ASSESSMENT — LID POSITION - PTOSIS: OS_PTOSIS: LUL 1+

## 2023-03-17 ENCOUNTER — OFFICE (OUTPATIENT)
Dept: URBAN - METROPOLITAN AREA CLINIC 122 | Facility: CLINIC | Age: 63
Setting detail: OPHTHALMOLOGY
End: 2023-03-17
Payer: COMMERCIAL

## 2023-03-17 DIAGNOSIS — Z96.1: ICD-10-CM

## 2023-03-17 DIAGNOSIS — E11.9: ICD-10-CM

## 2023-03-17 DIAGNOSIS — H26.492: ICD-10-CM

## 2023-03-17 DIAGNOSIS — E11.3293: ICD-10-CM

## 2023-03-17 DIAGNOSIS — H16.223: ICD-10-CM

## 2023-03-17 DIAGNOSIS — H40.89: ICD-10-CM

## 2023-03-17 PROCEDURE — 92012 INTRM OPH EXAM EST PATIENT: CPT | Performed by: OPHTHALMOLOGY

## 2023-03-17 ASSESSMENT — REFRACTION_MANIFEST
OS_VA1: 20/400
OD_VA1: 20/30+2
OD_SPHERE: PLANO
OS_VA1: 20/150
OS_SPHERE: PLANO
OS_ADD: +2.75
OD_VA1: 20/20
OS_CYLINDER: SPH
OD_CYLINDER: SPH
OD_CYLINDER: +1.00
OD_SPHERE: -1.00
OS_SPHERE: PLANO
OD_AXIS: 165
OS_CYLINDER: SPH
OD_ADD: +2.75

## 2023-03-17 ASSESSMENT — TEAR BREAK UP TIME (TBUT)
OS_TBUT: 2+
OD_TBUT: 2+

## 2023-03-17 ASSESSMENT — REFRACTION_AUTOREFRACTION
OS_SPHERE: +0.50
OD_CYLINDER: -0.50
OS_AXIS: 172
OD_SPHERE: +0.50
OD_AXIS: 99
OS_CYLINDER: -0.25

## 2023-03-17 ASSESSMENT — VISUAL ACUITY
OS_BCVA: 20/20
OD_BCVA: 20/200

## 2023-03-17 ASSESSMENT — LID POSITION - PTOSIS: OS_PTOSIS: LUL 1+

## 2023-03-17 ASSESSMENT — TONOMETRY
OS_IOP_MMHG: 12
OD_IOP_MMHG: 14

## 2023-03-17 ASSESSMENT — PACHYMETRY
OS_CT_CORRECTION: -1
OS_CT_UM: 566

## 2023-03-17 ASSESSMENT — SPHEQUIV_DERIVED
OD_SPHEQUIV: 0.25
OD_SPHEQUIV: -0.5
OS_SPHEQUIV: 0.375

## 2023-05-24 ENCOUNTER — OFFICE (OUTPATIENT)
Dept: URBAN - METROPOLITAN AREA CLINIC 122 | Facility: CLINIC | Age: 63
Setting detail: OPHTHALMOLOGY
End: 2023-05-24
Payer: COMMERCIAL

## 2023-05-24 DIAGNOSIS — H16.223: ICD-10-CM

## 2023-05-24 DIAGNOSIS — H26.492: ICD-10-CM

## 2023-05-24 DIAGNOSIS — Z96.1: ICD-10-CM

## 2023-05-24 DIAGNOSIS — H40.89: ICD-10-CM

## 2023-05-24 PROCEDURE — 92012 INTRM OPH EXAM EST PATIENT: CPT | Performed by: OPHTHALMOLOGY

## 2023-05-24 PROCEDURE — 92083 EXTENDED VISUAL FIELD XM: CPT | Performed by: OPHTHALMOLOGY

## 2023-05-24 ASSESSMENT — REFRACTION_AUTOREFRACTION
OS_SPHERE: +0.50
OD_CYLINDER: -0.50
OD_AXIS: 99
OS_CYLINDER: -0.25
OS_AXIS: 172
OD_SPHERE: +0.50

## 2023-05-24 ASSESSMENT — REFRACTION_MANIFEST
OS_SPHERE: PLANO
OD_VA1: 20/30+2
OS_VA1: 20/400
OD_CYLINDER: +1.00
OS_SPHERE: PLANO
OD_ADD: +2.75
OD_SPHERE: PLANO
OD_AXIS: 165
OD_SPHERE: -1.00
OS_VA1: 20/150
OD_CYLINDER: SPH
OS_ADD: +2.75
OD_VA1: 20/20
OS_CYLINDER: SPH
OS_CYLINDER: SPH

## 2023-05-24 ASSESSMENT — SPHEQUIV_DERIVED
OS_SPHEQUIV: 0.375
OD_SPHEQUIV: -0.5
OD_SPHEQUIV: 0.25

## 2023-05-24 ASSESSMENT — PACHYMETRY
OS_CT_UM: 566
OS_CT_CORRECTION: -1

## 2023-05-24 ASSESSMENT — TEAR BREAK UP TIME (TBUT)
OD_TBUT: 2+
OS_TBUT: 2+

## 2023-05-24 ASSESSMENT — CONFRONTATIONAL VISUAL FIELD TEST (CVF)
OS_FINDINGS: FULL
OD_FINDINGS: FULL

## 2023-05-24 ASSESSMENT — TONOMETRY
OS_IOP_MMHG: 13
OD_IOP_MMHG: 12

## 2023-05-24 ASSESSMENT — VISUAL ACUITY
OD_BCVA: 20/200
OS_BCVA: 20/20

## 2023-05-24 ASSESSMENT — LID POSITION - PTOSIS: OS_PTOSIS: LUL 1+

## 2023-08-25 ENCOUNTER — OFFICE (OUTPATIENT)
Dept: URBAN - METROPOLITAN AREA CLINIC 122 | Facility: CLINIC | Age: 63
Setting detail: OPHTHALMOLOGY
End: 2023-08-25
Payer: COMMERCIAL

## 2023-08-25 DIAGNOSIS — H40.89: ICD-10-CM

## 2023-08-25 DIAGNOSIS — Z96.1: ICD-10-CM

## 2023-08-25 DIAGNOSIS — E11.9: ICD-10-CM

## 2023-08-25 DIAGNOSIS — H16.223: ICD-10-CM

## 2023-08-25 DIAGNOSIS — E11.3293: ICD-10-CM

## 2023-08-25 DIAGNOSIS — H26.492: ICD-10-CM

## 2023-08-25 PROCEDURE — 92133 CPTRZD OPH DX IMG PST SGM ON: CPT | Performed by: OPHTHALMOLOGY

## 2023-08-25 PROCEDURE — 92014 COMPRE OPH EXAM EST PT 1/>: CPT | Performed by: OPHTHALMOLOGY

## 2023-08-25 ASSESSMENT — REFRACTION_CURRENTRX
OS_OVR_VA: 20/
OD_OVR_VA: 20/
OD_VPRISM_DIRECTION: SV
OS_VPRISM_DIRECTION: SV
OD_SPHERE: +2.00
OS_CYLINDER: SPH
OS_SPHERE: +2.00
OD_CYLINDER: SPH

## 2023-08-25 ASSESSMENT — REFRACTION_MANIFEST
OS_VA1: 20/400
OS_VA1: 20/150
OD_ADD: +2.75
OD_AXIS: 165
OD_CYLINDER: SPH
OD_VA1: 20/30+2
OD_VA1: 20/20
OS_CYLINDER: SPH
OD_SPHERE: -1.00
OS_ADD: +2.75
OD_CYLINDER: +1.00
OS_CYLINDER: SPH
OD_SPHERE: PLANO
OS_SPHERE: PLANO
OS_SPHERE: PLANO

## 2023-08-25 ASSESSMENT — TEAR BREAK UP TIME (TBUT)
OD_TBUT: 2+
OS_TBUT: 2+

## 2023-08-25 ASSESSMENT — CONFRONTATIONAL VISUAL FIELD TEST (CVF)
OS_FINDINGS: FULL
OD_FINDINGS: FULL

## 2023-08-25 ASSESSMENT — REFRACTION_AUTOREFRACTION
OS_SPHERE: +0.50
OD_CYLINDER: -0.50
OD_SPHERE: +0.50
OS_AXIS: 172
OS_CYLINDER: -0.25
OD_AXIS: 99

## 2023-08-25 ASSESSMENT — PACHYMETRY
OS_CT_CORRECTION: -1
OS_CT_UM: 566

## 2023-08-25 ASSESSMENT — SPHEQUIV_DERIVED
OD_SPHEQUIV: 0.25
OS_SPHEQUIV: 0.375
OD_SPHEQUIV: -0.5

## 2023-08-25 ASSESSMENT — TONOMETRY
OS_IOP_MMHG: 13
OD_IOP_MMHG: 12

## 2023-08-25 ASSESSMENT — VISUAL ACUITY
OD_BCVA: 20/200
OS_BCVA: 20/20

## 2023-08-25 ASSESSMENT — LID POSITION - PTOSIS: OS_PTOSIS: LUL 1+

## 2023-12-01 ENCOUNTER — OFFICE (OUTPATIENT)
Dept: URBAN - METROPOLITAN AREA CLINIC 122 | Facility: CLINIC | Age: 63
Setting detail: OPHTHALMOLOGY
End: 2023-12-01
Payer: COMMERCIAL

## 2023-12-01 DIAGNOSIS — E11.3293: ICD-10-CM

## 2023-12-01 DIAGNOSIS — H16.223: ICD-10-CM

## 2023-12-01 DIAGNOSIS — H34.8310: ICD-10-CM

## 2023-12-01 DIAGNOSIS — H40.89: ICD-10-CM

## 2023-12-01 DIAGNOSIS — Z96.1: ICD-10-CM

## 2023-12-01 DIAGNOSIS — H26.492: ICD-10-CM

## 2023-12-01 PROCEDURE — 92134 CPTRZ OPH DX IMG PST SGM RTA: CPT | Performed by: OPHTHALMOLOGY

## 2023-12-01 PROCEDURE — 92012 INTRM OPH EXAM EST PATIENT: CPT | Performed by: OPHTHALMOLOGY

## 2023-12-01 ASSESSMENT — REFRACTION_CURRENTRX
OD_OVR_VA: 20/
OS_VPRISM_DIRECTION: SV
OD_VPRISM_DIRECTION: SV
OS_SPHERE: +2.00
OD_SPHERE: +2.00
OS_OVR_VA: 20/
OS_CYLINDER: SPH
OD_CYLINDER: SPH

## 2023-12-01 ASSESSMENT — REFRACTION_MANIFEST
OD_ADD: +2.75
OD_SPHERE: PLANO
OD_CYLINDER: +1.00
OS_SPHERE: PLANO
OD_SPHERE: -1.00
OS_VA1: 20/400
OS_VA1: 20/150
OD_AXIS: 165
OD_CYLINDER: SPH
OS_SPHERE: PLANO
OD_VA1: 20/20
OS_ADD: +2.75
OS_CYLINDER: SPH
OD_VA1: 20/30+2
OS_CYLINDER: SPH

## 2023-12-01 ASSESSMENT — REFRACTION_AUTOREFRACTION
OS_SPHERE: +0.50
OD_AXIS: 99
OD_CYLINDER: -0.50
OS_CYLINDER: -0.25
OS_AXIS: 172
OD_SPHERE: +0.50

## 2023-12-01 ASSESSMENT — SPHEQUIV_DERIVED
OD_SPHEQUIV: -0.5
OD_SPHEQUIV: 0.25
OS_SPHEQUIV: 0.375

## 2023-12-01 ASSESSMENT — TEAR BREAK UP TIME (TBUT)
OS_TBUT: 2+
OD_TBUT: 2+

## 2023-12-01 ASSESSMENT — CONFRONTATIONAL VISUAL FIELD TEST (CVF)
OD_FINDINGS: FULL
OS_FINDINGS: FULL

## 2023-12-01 ASSESSMENT — LID POSITION - PTOSIS: OS_PTOSIS: LUL 1+

## 2024-03-01 ENCOUNTER — OFFICE (OUTPATIENT)
Dept: URBAN - METROPOLITAN AREA CLINIC 122 | Facility: CLINIC | Age: 64
Setting detail: OPHTHALMOLOGY
End: 2024-03-01
Payer: COMMERCIAL

## 2024-03-01 DIAGNOSIS — Z96.1: ICD-10-CM

## 2024-03-01 DIAGNOSIS — H34.8310: ICD-10-CM

## 2024-03-01 DIAGNOSIS — H26.492: ICD-10-CM

## 2024-03-01 DIAGNOSIS — H16.223: ICD-10-CM

## 2024-03-01 DIAGNOSIS — H40.89: ICD-10-CM

## 2024-03-01 DIAGNOSIS — E11.3293: ICD-10-CM

## 2024-03-01 PROCEDURE — 92083 EXTENDED VISUAL FIELD XM: CPT | Performed by: OPHTHALMOLOGY

## 2024-03-01 PROCEDURE — 92012 INTRM OPH EXAM EST PATIENT: CPT | Performed by: OPHTHALMOLOGY

## 2024-03-01 ASSESSMENT — REFRACTION_MANIFEST
OS_VA1: 20/400
OS_SPHERE: PLANO
OD_SPHERE: PLANO
OD_SPHERE: -1.00
OS_CYLINDER: SPH
OS_ADD: +2.75
OD_VA1: 20/30+2
OD_VA1: 20/20
OS_VA1: 20/150
OD_CYLINDER: SPH
OS_CYLINDER: SPH
OD_AXIS: 165
OS_SPHERE: PLANO
OD_ADD: +2.75
OD_CYLINDER: +1.00

## 2024-03-01 ASSESSMENT — REFRACTION_CURRENTRX
OD_VPRISM_DIRECTION: SV
OS_OVR_VA: 20/
OD_SPHERE: +2.00
OD_OVR_VA: 20/
OS_VPRISM_DIRECTION: SV
OS_SPHERE: +2.00
OD_CYLINDER: SPH
OS_CYLINDER: SPH

## 2024-03-01 ASSESSMENT — LID POSITION - PTOSIS: OS_PTOSIS: LUL 1+

## 2024-03-01 ASSESSMENT — SPHEQUIV_DERIVED: OD_SPHEQUIV: -0.5

## 2024-05-08 RX ORDER — PILOCARPINE HYDROCHLORIDE OPHTHALMIC SOLUTION 10 MG/ML
1 SOLUTION/ DROPS OPHTHALMIC
Qty: 15 | Refills: 0 | Status: DISCONTINUED | COMMUNITY
Start: 2022-04-01 | End: 2024-05-08

## 2024-05-08 RX ORDER — LATANOPROSTENE BUNOD 0.24 MG/ML
0.02 SOLUTION/ DROPS OPHTHALMIC
Refills: 0 | Status: DISCONTINUED | COMMUNITY
End: 2024-05-08

## 2024-05-14 ENCOUNTER — NON-APPOINTMENT (OUTPATIENT)
Age: 64
End: 2024-05-14

## 2024-05-14 ENCOUNTER — APPOINTMENT (OUTPATIENT)
Dept: HEART AND VASCULAR | Facility: CLINIC | Age: 64
End: 2024-05-14
Payer: COMMERCIAL

## 2024-05-14 VITALS
RESPIRATION RATE: 16 BRPM | DIASTOLIC BLOOD PRESSURE: 88 MMHG | BODY MASS INDEX: 28.89 KG/M2 | HEIGHT: 73 IN | SYSTOLIC BLOOD PRESSURE: 130 MMHG | TEMPERATURE: 97.7 F | OXYGEN SATURATION: 97 % | HEART RATE: 78 BPM | WEIGHT: 218 LBS

## 2024-05-14 DIAGNOSIS — R06.09 OTHER FORMS OF DYSPNEA: ICD-10-CM

## 2024-05-14 PROCEDURE — 99214 OFFICE O/P EST MOD 30 MIN: CPT

## 2024-05-14 PROCEDURE — 93000 ELECTROCARDIOGRAM COMPLETE: CPT

## 2024-05-14 RX ORDER — METFORMIN ER 500 MG 500 MG/1
500 TABLET ORAL EVERY MORNING
Refills: 0 | Status: ACTIVE | COMMUNITY

## 2024-05-14 RX ORDER — GLIPIZIDE 5 MG/1
5 TABLET ORAL DAILY
Refills: 0 | Status: ACTIVE | COMMUNITY

## 2024-05-14 NOTE — DISCUSSION/SUMMARY
[Possible Cardiac Ischemia (Intermd Prob)] : possible cardiac ischemia (intermediate probability) [Echocardiogram] : echocardiogram [Stress Echocardiogram] : stress echocardiogram [Multidetector Cardiac CT] : multidetector cardiac CT [Hyperlipidemia] : hyperlipidemia [Diet Modification] : diet modification [Exercise] : exercise [Weight Loss] : weight loss [Hypertension] : hypertension [Stable] : stable [Low Sodium Diet] : low sodium diet [___ Month(s)] : in [unfilled] month(s) [de-identified] : ; Feb 2024 [de-identified] : c/w Atorvastatin 20 mg PO daily,. wants to try plant based diet. Will recheck in 6 months.  [de-identified] : c/w Enalapril 10 mg PO daily and Amlodipine 10 mg PO daily [EKG obtained to assist in diagnosis and management of assessed problem(s)] : EKG obtained to assist in diagnosis and management of assessed problem(s)

## 2024-05-14 NOTE — REASON FOR VISIT
Call patient to get an update on his condition. States the fever has broken. He states that he was very fatigued yesterday and is not doing much today. Encouraged to continue taking the zinc and the vitamins and resting as he needs.   Return to clinic if
[FreeTextEntry1] : 64 year old with DM, HTN and HL here for routine followup. He denies chest pain, orthopnea, PND, edema. However gets dyspneic on exertion.   Feb 2024 labs: CBC WNL; Vit D 33; Total chol 189; HDL 48; TG 86; ; A1C 7.5  ECG today NSR, ST T changes, early repolarization, consider anterolateral ischemia (unchanged) CTA 6/2020: min RCA disease; LAD bridge Carotid Duplex 3/2020: min disease LICA MANJU 12/2019: aorta atherosclerosis ZIO 12/2019: APC/PVC EXSE 11/2019: nl lv sys fxn; nl nguyen fxn; mild MR; 9:50 min Chuck; no ischemia EXSE 6/2021: nl lv sys fxn; nl nguyen fxn; LVH; min MR; 7:30 min Chuck; no ischemia

## 2024-05-15 LAB
ALBUMIN SERPL ELPH-MCNC: 5 G/DL
ALP BLD-CCNC: 50 U/L
ALT SERPL-CCNC: 35 U/L
ANION GAP SERPL CALC-SCNC: 13 MMOL/L
AST SERPL-CCNC: 19 U/L
BASOPHILS # BLD AUTO: 0.05 K/UL
BASOPHILS NFR BLD AUTO: 0.9 %
BILIRUB SERPL-MCNC: 0.4 MG/DL
BUN SERPL-MCNC: 17 MG/DL
CALCIUM SERPL-MCNC: 10.4 MG/DL
CHLORIDE SERPL-SCNC: 102 MMOL/L
CHOLEST SERPL-MCNC: 196 MG/DL
CO2 SERPL-SCNC: 26 MMOL/L
CREAT SERPL-MCNC: 1.07 MG/DL
EGFR: 77 ML/MIN/1.73M2
EOSINOPHIL # BLD AUTO: 0.25 K/UL
EOSINOPHIL NFR BLD AUTO: 4.4 %
GLUCOSE SERPL-MCNC: 146 MG/DL
HCT VFR BLD CALC: 52.8 %
HDLC SERPL-MCNC: 48 MG/DL
HGB BLD-MCNC: 16.5 G/DL
IMM GRANULOCYTES NFR BLD AUTO: 0.2 %
LDLC SERPL CALC-MCNC: 135 MG/DL
LYMPHOCYTES # BLD AUTO: 2.27 K/UL
LYMPHOCYTES NFR BLD AUTO: 39.7 %
MAN DIFF?: NORMAL
MCHC RBC-ENTMCNC: 26.9 PG
MCHC RBC-ENTMCNC: 31.3 GM/DL
MCV RBC AUTO: 86.1 FL
MONOCYTES # BLD AUTO: 0.57 K/UL
MONOCYTES NFR BLD AUTO: 10 %
NEUTROPHILS # BLD AUTO: 2.57 K/UL
NEUTROPHILS NFR BLD AUTO: 44.8 %
NONHDLC SERPL-MCNC: 148 MG/DL
PLATELET # BLD AUTO: 229 K/UL
POTASSIUM SERPL-SCNC: 5.2 MMOL/L
PROT SERPL-MCNC: 7.5 G/DL
RBC # BLD: 6.13 M/UL
RBC # FLD: 15.4 %
SODIUM SERPL-SCNC: 140 MMOL/L
TRIGL SERPL-MCNC: 75 MG/DL
WBC # FLD AUTO: 5.72 K/UL

## 2024-05-22 ENCOUNTER — NON-APPOINTMENT (OUTPATIENT)
Age: 64
End: 2024-05-22

## 2024-05-22 RX ORDER — ATORVASTATIN CALCIUM 20 MG/1
20 TABLET, FILM COATED ORAL
Refills: 0 | Status: DISCONTINUED | COMMUNITY
End: 2024-05-22

## 2024-05-22 RX ORDER — ATORVASTATIN CALCIUM 40 MG/1
40 TABLET, FILM COATED ORAL
Qty: 90 | Refills: 3 | Status: ACTIVE | COMMUNITY
Start: 2024-05-22 | End: 1900-01-01

## 2024-06-07 ENCOUNTER — OFFICE (OUTPATIENT)
Dept: URBAN - METROPOLITAN AREA CLINIC 122 | Facility: CLINIC | Age: 64
Setting detail: OPHTHALMOLOGY
End: 2024-06-07
Payer: COMMERCIAL

## 2024-06-07 DIAGNOSIS — Z96.1: ICD-10-CM

## 2024-06-07 DIAGNOSIS — E11.3293: ICD-10-CM

## 2024-06-07 DIAGNOSIS — H40.89: ICD-10-CM

## 2024-06-07 DIAGNOSIS — H16.223: ICD-10-CM

## 2024-06-07 DIAGNOSIS — H26.492: ICD-10-CM

## 2024-06-07 DIAGNOSIS — H34.8310: ICD-10-CM

## 2024-06-07 PROCEDURE — 92133 CPTRZD OPH DX IMG PST SGM ON: CPT | Performed by: OPHTHALMOLOGY

## 2024-06-07 PROCEDURE — 92012 INTRM OPH EXAM EST PATIENT: CPT | Performed by: OPHTHALMOLOGY

## 2024-06-07 ASSESSMENT — LID POSITION - PTOSIS
OD_PTOSIS: RUL 1+
OS_PTOSIS: LUL 1+

## 2024-06-07 ASSESSMENT — CONFRONTATIONAL VISUAL FIELD TEST (CVF)
OS_FINDINGS: FULL
OD_FINDINGS: FULL

## 2024-06-19 ENCOUNTER — APPOINTMENT (OUTPATIENT)
Dept: HEART AND VASCULAR | Facility: CLINIC | Age: 64
End: 2024-06-19
Payer: COMMERCIAL

## 2024-06-19 VITALS
WEIGHT: 218 LBS | DIASTOLIC BLOOD PRESSURE: 78 MMHG | HEIGHT: 73 IN | HEART RATE: 78 BPM | SYSTOLIC BLOOD PRESSURE: 158 MMHG | BODY MASS INDEX: 28.89 KG/M2

## 2024-06-19 PROCEDURE — 93351 STRESS TTE COMPLETE: CPT

## 2024-06-19 PROCEDURE — 93325 DOPPLER ECHO COLOR FLOW MAPG: CPT

## 2024-06-19 PROCEDURE — 93320 DOPPLER ECHO COMPLETE: CPT

## 2024-06-24 ENCOUNTER — APPOINTMENT (OUTPATIENT)
Dept: HEART AND VASCULAR | Facility: CLINIC | Age: 64
End: 2024-06-24
Payer: COMMERCIAL

## 2024-06-24 PROCEDURE — 36415 COLL VENOUS BLD VENIPUNCTURE: CPT

## 2024-06-25 LAB
ALBUMIN SERPL ELPH-MCNC: 4.4 G/DL
ALP BLD-CCNC: 62 U/L
ALT SERPL-CCNC: 30 U/L
ANION GAP SERPL CALC-SCNC: 16 MMOL/L
AST SERPL-CCNC: 18 U/L
BILIRUB SERPL-MCNC: 0.4 MG/DL
BUN SERPL-MCNC: 21 MG/DL
CALCIUM SERPL-MCNC: 9.9 MG/DL
CHLORIDE SERPL-SCNC: 99 MMOL/L
CO2 SERPL-SCNC: 22 MMOL/L
CREAT SERPL-MCNC: 1.09 MG/DL
EGFR: 76 ML/MIN/1.73M2
GLUCOSE SERPL-MCNC: 230 MG/DL
POTASSIUM SERPL-SCNC: 4.1 MMOL/L
PROT SERPL-MCNC: 6.9 G/DL
SODIUM SERPL-SCNC: 138 MMOL/L

## 2024-06-26 ENCOUNTER — RESULT REVIEW (OUTPATIENT)
Age: 64
End: 2024-06-26

## 2024-07-08 ENCOUNTER — RESULT REVIEW (OUTPATIENT)
Age: 64
End: 2024-07-08

## 2024-07-09 ENCOUNTER — NON-APPOINTMENT (OUTPATIENT)
Age: 64
End: 2024-07-09

## 2024-07-30 ENCOUNTER — NON-APPOINTMENT (OUTPATIENT)
Age: 64
End: 2024-07-30

## 2024-07-30 RX ORDER — EMPAGLIFLOZIN, METFORMIN HYDROCHLORIDE 25; 1000 MG/1; MG/1
TABLET, EXTENDED RELEASE ORAL DAILY
Refills: 0 | Status: ACTIVE | COMMUNITY

## 2024-07-31 ENCOUNTER — APPOINTMENT (OUTPATIENT)
Dept: HEART AND VASCULAR | Facility: CLINIC | Age: 64
End: 2024-07-31

## 2024-07-31 VITALS
RESPIRATION RATE: 15 BRPM | OXYGEN SATURATION: 97 % | HEART RATE: 82 BPM | HEIGHT: 73 IN | DIASTOLIC BLOOD PRESSURE: 76 MMHG | SYSTOLIC BLOOD PRESSURE: 144 MMHG | BODY MASS INDEX: 29.55 KG/M2 | WEIGHT: 223 LBS

## 2024-07-31 DIAGNOSIS — E78.5 HYPERLIPIDEMIA, UNSPECIFIED: ICD-10-CM

## 2024-07-31 DIAGNOSIS — I10 ESSENTIAL (PRIMARY) HYPERTENSION: ICD-10-CM

## 2024-07-31 DIAGNOSIS — I25.10 ATHEROSCLEROTIC HEART DISEASE OF NATIVE CORONARY ARTERY W/OUT ANGINA PECTORIS: ICD-10-CM

## 2024-07-31 PROCEDURE — 99204 OFFICE O/P NEW MOD 45 MIN: CPT

## 2024-07-31 NOTE — HISTORY OF PRESENT ILLNESS
[FreeTextEntry1] : 64 year old with DM, HTN and HL here for routine followup. He denies chest pain, orthopnea, PND, edema. However gets dyspneic on exertion. Former patient of Dr. Parks     Cardiac Workup: ECG today NSR, ST T changes, early repolarization, consider anterolateral ischemia (unchanged) CTA 6/2020: min RCA disease; LAD bridge Carotid Duplex 3/2020: min disease LICA MANJU 12/2019: aorta atherosclerosis ZIO 12/2019: APC/PVC EXSE 11/2019: nl lv sys fxn; nl nguyen fxn; mild MR; 9:50 min Chuck; no ischemia EXSE 6/2021: nl lv sys fxn; nl nguyen fxn; LVH; min MR; 7:30 min Chuck; no ischemia

## 2024-08-09 ENCOUNTER — OFFICE (OUTPATIENT)
Dept: URBAN - METROPOLITAN AREA CLINIC 122 | Facility: CLINIC | Age: 64
Setting detail: OPHTHALMOLOGY
End: 2024-08-09
Payer: COMMERCIAL

## 2024-08-09 DIAGNOSIS — H34.8310: ICD-10-CM

## 2024-08-09 DIAGNOSIS — H26.492: ICD-10-CM

## 2024-08-09 DIAGNOSIS — E11.3293: ICD-10-CM

## 2024-08-09 DIAGNOSIS — H40.89: ICD-10-CM

## 2024-08-09 DIAGNOSIS — Z96.1: ICD-10-CM

## 2024-08-09 DIAGNOSIS — H16.223: ICD-10-CM

## 2024-08-09 PROCEDURE — 92014 COMPRE OPH EXAM EST PT 1/>: CPT | Performed by: OPHTHALMOLOGY

## 2024-08-09 PROCEDURE — 92250 FUNDUS PHOTOGRAPHY W/I&R: CPT | Performed by: OPHTHALMOLOGY

## 2024-08-09 ASSESSMENT — LID POSITION - PTOSIS
OS_PTOSIS: LUL 1+
OD_PTOSIS: RUL 1+

## 2024-09-24 ENCOUNTER — TRANSCRIPTION ENCOUNTER (OUTPATIENT)
Age: 64
End: 2024-09-24

## 2024-09-24 ENCOUNTER — APPOINTMENT (OUTPATIENT)
Dept: HEART AND VASCULAR | Facility: CLINIC | Age: 64
End: 2024-09-24
Payer: COMMERCIAL

## 2024-09-24 PROCEDURE — 36415 COLL VENOUS BLD VENIPUNCTURE: CPT

## 2024-09-25 LAB
ALBUMIN SERPL ELPH-MCNC: 4.9 G/DL
ALP BLD-CCNC: 65 U/L
ALT SERPL-CCNC: 31 U/L
ANION GAP SERPL CALC-SCNC: 13 MMOL/L
APPEARANCE: CLEAR
AST SERPL-CCNC: 22 U/L
BACTERIA: NEGATIVE /HPF
BASOPHILS # BLD AUTO: 0.05 K/UL
BASOPHILS NFR BLD AUTO: 0.9 %
BILIRUB SERPL-MCNC: 0.5 MG/DL
BILIRUBIN URINE: NEGATIVE
BLOOD URINE: NEGATIVE
BUN SERPL-MCNC: 16 MG/DL
CALCIUM SERPL-MCNC: 10.1 MG/DL
CAST: 0 /LPF
CHLORIDE SERPL-SCNC: 100 MMOL/L
CHOLEST SERPL-MCNC: 166 MG/DL
CK SERPL-CCNC: 138 U/L
CO2 SERPL-SCNC: 28 MMOL/L
COLOR: YELLOW
CREAT SERPL-MCNC: 0.97 MG/DL
EGFR: 87 ML/MIN/1.73M2
EOSINOPHIL # BLD AUTO: 0.34 K/UL
EOSINOPHIL NFR BLD AUTO: 6.4 %
EPITHELIAL CELLS: 1 /HPF
ESTIMATED AVERAGE GLUCOSE: 154 MG/DL
GLUCOSE QUALITATIVE U: >=1000 MG/DL
GLUCOSE SERPL-MCNC: 154 MG/DL
HBA1C MFR BLD HPLC: 7 %
HCT VFR BLD CALC: 53.1 %
HDLC SERPL-MCNC: 46 MG/DL
HGB BLD-MCNC: 16.3 G/DL
IMM GRANULOCYTES NFR BLD AUTO: 0.2 %
INR PPP: 0.91 RATIO
KETONES URINE: 15 MG/DL
LDLC SERPL CALC-MCNC: 107 MG/DL
LEUKOCYTE ESTERASE URINE: NEGATIVE
LYMPHOCYTES # BLD AUTO: 2.19 K/UL
LYMPHOCYTES NFR BLD AUTO: 41.5 %
MAN DIFF?: NORMAL
MCHC RBC-ENTMCNC: 27.1 PG
MCHC RBC-ENTMCNC: 30.7 GM/DL
MCV RBC AUTO: 88.4 FL
MICROSCOPIC-UA: NORMAL
MONOCYTES # BLD AUTO: 0.47 K/UL
MONOCYTES NFR BLD AUTO: 8.9 %
NEUTROPHILS # BLD AUTO: 2.22 K/UL
NEUTROPHILS NFR BLD AUTO: 42.1 %
NITRITE URINE: NEGATIVE
NONHDLC SERPL-MCNC: 120 MG/DL
PH URINE: 5.5
PLATELET # BLD AUTO: 221 K/UL
POTASSIUM SERPL-SCNC: 4 MMOL/L
PROT SERPL-MCNC: 7.5 G/DL
PROTEIN URINE: NEGATIVE MG/DL
PT BLD: 10.8 SEC
RBC # BLD: 6.01 M/UL
RBC # FLD: 15.5 %
RED BLOOD CELLS URINE: 0 /HPF
SODIUM SERPL-SCNC: 141 MMOL/L
SPECIFIC GRAVITY URINE: >1.03
TRIGL SERPL-MCNC: 65 MG/DL
UROBILINOGEN URINE: 1 MG/DL
WBC # FLD AUTO: 5.28 K/UL
WHITE BLOOD CELLS URINE: 0 /HPF

## 2024-09-26 ENCOUNTER — NON-APPOINTMENT (OUTPATIENT)
Age: 64
End: 2024-09-26

## 2024-10-21 ENCOUNTER — APPOINTMENT (OUTPATIENT)
Dept: HEART AND VASCULAR | Facility: CLINIC | Age: 64
End: 2024-10-21

## 2024-10-21 VITALS
DIASTOLIC BLOOD PRESSURE: 76 MMHG | TEMPERATURE: 97.7 F | HEIGHT: 74 IN | SYSTOLIC BLOOD PRESSURE: 130 MMHG | BODY MASS INDEX: 27.59 KG/M2 | RESPIRATION RATE: 16 BRPM | HEART RATE: 75 BPM | OXYGEN SATURATION: 98 % | WEIGHT: 215 LBS

## 2024-10-21 PROCEDURE — 99214 OFFICE O/P EST MOD 30 MIN: CPT

## 2024-11-08 ENCOUNTER — OFFICE (OUTPATIENT)
Dept: URBAN - METROPOLITAN AREA CLINIC 122 | Facility: CLINIC | Age: 64
Setting detail: OPHTHALMOLOGY
End: 2024-11-08
Payer: COMMERCIAL

## 2024-11-08 DIAGNOSIS — H16.223: ICD-10-CM

## 2024-11-08 DIAGNOSIS — E11.3293: ICD-10-CM

## 2024-11-08 DIAGNOSIS — H26.492: ICD-10-CM

## 2024-11-08 DIAGNOSIS — Z96.1: ICD-10-CM

## 2024-11-08 DIAGNOSIS — H34.8310: ICD-10-CM

## 2024-11-08 DIAGNOSIS — H40.89: ICD-10-CM

## 2024-11-08 PROCEDURE — 92012 INTRM OPH EXAM EST PATIENT: CPT | Performed by: OPHTHALMOLOGY

## 2024-11-08 PROCEDURE — 92134 CPTRZ OPH DX IMG PST SGM RTA: CPT | Performed by: OPHTHALMOLOGY

## 2024-11-08 ASSESSMENT — REFRACTION_MANIFEST
OD_SPHERE: PLANO
OD_ADD: +2.75
OS_SPHERE: PLANO
OS_VA1: 20/150
OS_ADD: +2.75
OS_CYLINDER: SPH
OS_VA1: 20/400
OD_CYLINDER: SPH
OD_CYLINDER: +1.00
OS_CYLINDER: SPH
OD_SPHERE: -1.00
OS_SPHERE: PLANO
OD_AXIS: 165
OD_VA1: 20/20
OD_VA1: 20/30+2

## 2024-11-08 ASSESSMENT — TONOMETRY
OS_IOP_MMHG: 14
OD_IOP_MMHG: 12

## 2024-11-08 ASSESSMENT — REFRACTION_AUTOREFRACTION
OD_AXIS: 44
OS_AXIS: 168
OD_SPHERE: PLANO
OS_CYLINDER: -0.25
OD_CYLINDER: -0.25
OS_SPHERE: +0.50

## 2024-11-08 ASSESSMENT — LID POSITION - PTOSIS
OS_PTOSIS: LUL 1+
OD_PTOSIS: RUL 1+

## 2024-11-08 ASSESSMENT — PACHYMETRY
OS_CT_UM: 566
OS_CT_CORRECTION: -1

## 2024-11-08 ASSESSMENT — CONFRONTATIONAL VISUAL FIELD TEST (CVF)
OS_FINDINGS: FULL
OD_FINDINGS: FULL

## 2024-11-08 ASSESSMENT — VISUAL ACUITY
OD_BCVA: 20/400
OS_BCVA: 20/25-

## 2024-11-08 ASSESSMENT — REFRACTION_CURRENTRX
OD_VPRISM_DIRECTION: SV
OD_SPHERE: +2.00
OS_OVR_VA: 20/
OS_VPRISM_DIRECTION: SV
OD_OVR_VA: 20/
OS_CYLINDER: SPH
OD_CYLINDER: SPH
OS_SPHERE: +2.00

## 2024-11-08 ASSESSMENT — TEAR BREAK UP TIME (TBUT)
OD_TBUT: 2+
OS_TBUT: 2+

## 2024-12-23 ENCOUNTER — APPOINTMENT (OUTPATIENT)
Dept: HEART AND VASCULAR | Facility: CLINIC | Age: 64
End: 2024-12-23
Payer: COMMERCIAL

## 2024-12-23 PROCEDURE — 36415 COLL VENOUS BLD VENIPUNCTURE: CPT

## 2024-12-26 LAB
ALBUMIN SERPL ELPH-MCNC: 4.7 G/DL
ALP BLD-CCNC: 68 U/L
ALT SERPL-CCNC: 27 U/L
ANION GAP SERPL CALC-SCNC: 15 MMOL/L
APO LP(A) SERPL-MCNC: 61.9 NMOL/L
AST SERPL-CCNC: 19 U/L
BASOPHILS # BLD AUTO: 0.06 K/UL
BASOPHILS NFR BLD AUTO: 1.3 %
BILIRUB SERPL-MCNC: 0.5 MG/DL
BUN SERPL-MCNC: 20 MG/DL
CALCIUM SERPL-MCNC: 10.4 MG/DL
CHLORIDE SERPL-SCNC: 101 MMOL/L
CHOLEST SERPL-MCNC: 147 MG/DL
CO2 SERPL-SCNC: 26 MMOL/L
CREAT SERPL-MCNC: 1.16 MG/DL
CRP SERPL HS-MCNC: 0.9 MG/L
EGFR: 70 ML/MIN/1.73M2
EOSINOPHIL # BLD AUTO: 0.32 K/UL
EOSINOPHIL NFR BLD AUTO: 7 %
ESTIMATED AVERAGE GLUCOSE: 143 MG/DL
GLUCOSE SERPL-MCNC: 184 MG/DL
HBA1C MFR BLD HPLC: 6.6 %
HCT VFR BLD CALC: 48.7 %
HDLC SERPL-MCNC: 48 MG/DL
HGB BLD-MCNC: 15.7 G/DL
IMM GRANULOCYTES NFR BLD AUTO: 0.2 %
LDLC SERPL CALC-MCNC: 87 MG/DL
LYMPHOCYTES # BLD AUTO: 1.62 K/UL
LYMPHOCYTES NFR BLD AUTO: 35.3 %
MAN DIFF?: NORMAL
MCHC RBC-ENTMCNC: 28 PG
MCHC RBC-ENTMCNC: 32.2 G/DL
MCV RBC AUTO: 86.8 FL
MONOCYTES # BLD AUTO: 0.43 K/UL
MONOCYTES NFR BLD AUTO: 9.4 %
NEUTROPHILS # BLD AUTO: 2.15 K/UL
NEUTROPHILS NFR BLD AUTO: 46.8 %
NONHDLC SERPL-MCNC: 99 MG/DL
NT-PROBNP SERPL-MCNC: 74 PG/ML
PLATELET # BLD AUTO: 252 K/UL
POTASSIUM SERPL-SCNC: 4.6 MMOL/L
PROT SERPL-MCNC: 7.1 G/DL
RBC # BLD: 5.61 M/UL
RBC # FLD: 14.6 %
SODIUM SERPL-SCNC: 142 MMOL/L
TRIGL SERPL-MCNC: 59 MG/DL
TSH SERPL-ACNC: 1.19 UIU/ML
WBC # FLD AUTO: 4.59 K/UL

## 2025-02-14 ENCOUNTER — OFFICE (OUTPATIENT)
Dept: URBAN - METROPOLITAN AREA CLINIC 122 | Facility: CLINIC | Age: 65
Setting detail: OPHTHALMOLOGY
End: 2025-02-14
Payer: COMMERCIAL

## 2025-02-14 DIAGNOSIS — H26.492: ICD-10-CM

## 2025-02-14 DIAGNOSIS — H40.89: ICD-10-CM

## 2025-02-14 DIAGNOSIS — H16.223: ICD-10-CM

## 2025-02-14 DIAGNOSIS — Z96.1: ICD-10-CM

## 2025-02-14 DIAGNOSIS — E11.3293: ICD-10-CM

## 2025-02-14 DIAGNOSIS — H34.8310: ICD-10-CM

## 2025-02-14 PROCEDURE — 92083 EXTENDED VISUAL FIELD XM: CPT | Performed by: OPHTHALMOLOGY

## 2025-02-14 PROCEDURE — 92012 INTRM OPH EXAM EST PATIENT: CPT | Performed by: OPHTHALMOLOGY

## 2025-02-14 ASSESSMENT — REFRACTION_AUTOREFRACTION
OD_AXIS: 44
OS_SPHERE: +0.50
OD_SPHERE: PLANO
OD_CYLINDER: -0.25
OS_AXIS: 168
OS_CYLINDER: -0.25

## 2025-02-14 ASSESSMENT — REFRACTION_MANIFEST
OD_ADD: +2.75
OS_CYLINDER: SPH
OS_SPHERE: PLANO
OD_CYLINDER: +1.00
OD_VA1: 20/20
OD_VA1: 20/30+2
OS_SPHERE: PLANO
OS_CYLINDER: SPH
OD_SPHERE: -1.00
OS_VA1: 20/400
OD_CYLINDER: SPH
OD_AXIS: 165
OS_VA1: 20/150
OD_SPHERE: PLANO
OS_ADD: +2.75

## 2025-02-14 ASSESSMENT — TONOMETRY
OS_IOP_MMHG: 18
OD_IOP_MMHG: 14

## 2025-02-14 ASSESSMENT — REFRACTION_CURRENTRX
OD_CYLINDER: SPH
OD_OVR_VA: 20/
OD_VPRISM_DIRECTION: SV
OD_SPHERE: +2.00
OS_VPRISM_DIRECTION: SV
OS_OVR_VA: 20/
OS_SPHERE: +2.00
OS_CYLINDER: SPH

## 2025-02-14 ASSESSMENT — VISUAL ACUITY
OD_BCVA: 20/400
OS_BCVA: 20/20

## 2025-02-14 ASSESSMENT — PACHYMETRY
OS_CT_CORRECTION: -1
OS_CT_UM: 566

## 2025-02-14 ASSESSMENT — TEAR BREAK UP TIME (TBUT)
OD_TBUT: 2+
OS_TBUT: 2+

## 2025-02-14 ASSESSMENT — LID POSITION - PTOSIS
OS_PTOSIS: LUL 1+
OD_PTOSIS: RUL 1+

## 2025-03-28 ENCOUNTER — OFFICE (OUTPATIENT)
Dept: URBAN - METROPOLITAN AREA CLINIC 122 | Facility: CLINIC | Age: 65
Setting detail: OPHTHALMOLOGY
End: 2025-03-28
Payer: COMMERCIAL

## 2025-03-28 DIAGNOSIS — H26.492: ICD-10-CM

## 2025-03-28 DIAGNOSIS — H40.89: ICD-10-CM

## 2025-03-28 DIAGNOSIS — H34.8310: ICD-10-CM

## 2025-03-28 DIAGNOSIS — H16.223: ICD-10-CM

## 2025-03-28 DIAGNOSIS — Z96.1: ICD-10-CM

## 2025-03-28 DIAGNOSIS — E11.3293: ICD-10-CM

## 2025-03-28 PROCEDURE — 92020 GONIOSCOPY: CPT | Performed by: OPHTHALMOLOGY

## 2025-03-28 PROCEDURE — 99214 OFFICE O/P EST MOD 30 MIN: CPT | Performed by: OPHTHALMOLOGY

## 2025-03-28 PROCEDURE — 92133 CPTRZD OPH DX IMG PST SGM ON: CPT | Performed by: OPHTHALMOLOGY

## 2025-03-28 ASSESSMENT — REFRACTION_CURRENTRX
OD_VPRISM_DIRECTION: SV
OS_CYLINDER: SPH
OS_SPHERE: +2.00
OS_OVR_VA: 20/
OD_CYLINDER: SPH
OD_SPHERE: +2.00
OD_OVR_VA: 20/
OS_VPRISM_DIRECTION: SV

## 2025-03-28 ASSESSMENT — LID POSITION - PTOSIS
OS_PTOSIS: LUL 1+
OD_PTOSIS: RUL 1+

## 2025-03-28 ASSESSMENT — REFRACTION_MANIFEST
OS_SPHERE: PLANO
OS_CYLINDER: SPH
OD_SPHERE: -1.00
OD_AXIS: 165
OD_CYLINDER: +1.00
OS_VA1: 20/150
OD_CYLINDER: SPH
OD_VA1: 20/30+2
OS_CYLINDER: SPH
OS_VA1: 20/400
OS_ADD: +2.75
OD_VA1: 20/20
OS_SPHERE: PLANO
OD_ADD: +2.75
OD_SPHERE: PLANO

## 2025-03-28 ASSESSMENT — TEAR BREAK UP TIME (TBUT)
OS_TBUT: 2+
OD_TBUT: 2+

## 2025-03-28 ASSESSMENT — PACHYMETRY
OS_CT_CORRECTION: -1
OS_CT_UM: 566

## 2025-03-28 ASSESSMENT — REFRACTION_AUTOREFRACTION
OS_SPHERE: +0.50
OS_AXIS: 168
OS_CYLINDER: -0.25
OD_CYLINDER: -0.25
OD_AXIS: 44
OD_SPHERE: PLANO

## 2025-03-28 ASSESSMENT — VISUAL ACUITY
OS_BCVA: 20/25
OD_BCVA: 20/400

## 2025-03-28 ASSESSMENT — TONOMETRY
OS_IOP_MMHG: 19
OD_IOP_MMHG: 10

## 2025-04-18 ENCOUNTER — OFFICE (OUTPATIENT)
Dept: URBAN - METROPOLITAN AREA CLINIC 122 | Facility: CLINIC | Age: 65
Setting detail: OPHTHALMOLOGY
End: 2025-04-18
Payer: COMMERCIAL

## 2025-04-18 DIAGNOSIS — H40.89: ICD-10-CM

## 2025-04-18 PROCEDURE — 65855 TRABECULOPLASTY LASER SURG: CPT | Mod: LT | Performed by: OPHTHALMOLOGY

## 2025-04-18 ASSESSMENT — REFRACTION_CURRENTRX
OS_OVR_VA: 20/
OS_VPRISM_DIRECTION: SV
OD_VPRISM_DIRECTION: SV
OD_CYLINDER: SPH
OS_SPHERE: +2.00
OD_OVR_VA: 20/
OS_CYLINDER: SPH
OD_SPHERE: +2.00

## 2025-04-18 ASSESSMENT — LID POSITION - PTOSIS
OD_PTOSIS: RUL 1+
OS_PTOSIS: LUL 1+

## 2025-04-18 ASSESSMENT — REFRACTION_MANIFEST
OD_ADD: +2.75
OD_SPHERE: PLANO
OD_CYLINDER: +1.00
OS_ADD: +2.75
OD_VA1: 20/30+2
OD_SPHERE: -1.00
OS_CYLINDER: SPH
OS_VA1: 20/150
OD_VA1: 20/20
OS_SPHERE: PLANO
OS_VA1: 20/400
OS_SPHERE: PLANO
OS_CYLINDER: SPH
OD_CYLINDER: SPH
OD_AXIS: 165

## 2025-04-18 ASSESSMENT — TEAR BREAK UP TIME (TBUT)
OS_TBUT: 2+
OD_TBUT: 2+

## 2025-04-18 ASSESSMENT — REFRACTION_AUTOREFRACTION
OS_CYLINDER: -0.25
OD_CYLINDER: -0.25
OS_SPHERE: +0.50
OD_SPHERE: PLANO
OS_AXIS: 168
OD_AXIS: 44

## 2025-04-18 ASSESSMENT — TONOMETRY
OD_IOP_MMHG: 11
OS_IOP_MMHG: 17

## 2025-04-18 ASSESSMENT — VISUAL ACUITY
OD_BCVA: 20/400
OS_BCVA: 20/40

## 2025-04-18 ASSESSMENT — PACHYMETRY
OS_CT_CORRECTION: -1
OS_CT_UM: 566

## 2025-04-21 ENCOUNTER — APPOINTMENT (OUTPATIENT)
Dept: HEART AND VASCULAR | Facility: CLINIC | Age: 65
End: 2025-04-21
Payer: COMMERCIAL

## 2025-04-21 ENCOUNTER — NON-APPOINTMENT (OUTPATIENT)
Age: 65
End: 2025-04-21

## 2025-04-21 VITALS
SYSTOLIC BLOOD PRESSURE: 130 MMHG | DIASTOLIC BLOOD PRESSURE: 86 MMHG | HEIGHT: 74 IN | BODY MASS INDEX: 27.98 KG/M2 | HEART RATE: 85 BPM | RESPIRATION RATE: 16 BRPM | WEIGHT: 218 LBS | OXYGEN SATURATION: 98 %

## 2025-04-21 DIAGNOSIS — R16.0 HEPATOMEGALY, NOT ELSEWHERE CLASSIFIED: ICD-10-CM

## 2025-04-21 PROCEDURE — 93000 ELECTROCARDIOGRAM COMPLETE: CPT

## 2025-04-21 PROCEDURE — 99214 OFFICE O/P EST MOD 30 MIN: CPT

## 2025-05-02 ENCOUNTER — OFFICE (OUTPATIENT)
Dept: URBAN - METROPOLITAN AREA CLINIC 122 | Facility: CLINIC | Age: 65
Setting detail: OPHTHALMOLOGY
End: 2025-05-02
Payer: COMMERCIAL

## 2025-05-02 DIAGNOSIS — H40.89: ICD-10-CM

## 2025-05-02 PROCEDURE — 92012 INTRM OPH EXAM EST PATIENT: CPT | Performed by: OPHTHALMOLOGY

## 2025-05-02 ASSESSMENT — TEAR BREAK UP TIME (TBUT)
OS_TBUT: 2+
OD_TBUT: 2+

## 2025-05-02 ASSESSMENT — REFRACTION_CURRENTRX
OS_VPRISM_DIRECTION: SV
OS_SPHERE: +2.00
OD_SPHERE: +2.00
OD_CYLINDER: SPH
OD_VPRISM_DIRECTION: SV
OS_OVR_VA: 20/
OD_OVR_VA: 20/
OS_CYLINDER: SPH

## 2025-05-02 ASSESSMENT — REFRACTION_MANIFEST
OS_SPHERE: PLANO
OD_AXIS: 165
OD_CYLINDER: +1.00
OS_CYLINDER: SPH
OD_CYLINDER: SPH
OS_ADD: +2.75
OD_ADD: +2.75
OD_VA1: 20/30+2
OS_VA1: 20/400
OS_SPHERE: PLANO
OD_SPHERE: PLANO
OD_VA1: 20/20
OD_SPHERE: -1.00
OS_VA1: 20/150
OS_CYLINDER: SPH

## 2025-05-02 ASSESSMENT — PACHYMETRY
OS_CT_UM: 566
OS_CT_CORRECTION: -1

## 2025-05-02 ASSESSMENT — REFRACTION_AUTOREFRACTION
OD_CYLINDER: -0.25
OS_AXIS: 168
OD_SPHERE: PLANO
OS_CYLINDER: -0.25
OD_AXIS: 44
OS_SPHERE: +0.50

## 2025-05-02 ASSESSMENT — VISUAL ACUITY
OS_BCVA: 20/25+2
OD_BCVA: 20/400

## 2025-05-02 ASSESSMENT — LID POSITION - PTOSIS
OS_PTOSIS: LUL 1+
OD_PTOSIS: RUL 1+

## 2025-05-02 ASSESSMENT — TONOMETRY
OD_IOP_MMHG: 10
OS_IOP_MMHG: 17

## 2025-05-13 ENCOUNTER — RESULT REVIEW (OUTPATIENT)
Age: 65
End: 2025-05-13

## 2025-05-22 ENCOUNTER — NON-APPOINTMENT (OUTPATIENT)
Age: 65
End: 2025-05-22

## 2025-06-27 ENCOUNTER — OFFICE (OUTPATIENT)
Dept: URBAN - METROPOLITAN AREA CLINIC 122 | Facility: CLINIC | Age: 65
Setting detail: OPHTHALMOLOGY
End: 2025-06-27
Payer: COMMERCIAL

## 2025-06-27 DIAGNOSIS — E11.3293: ICD-10-CM

## 2025-06-27 DIAGNOSIS — H40.89: ICD-10-CM

## 2025-06-27 DIAGNOSIS — Z96.1: ICD-10-CM

## 2025-06-27 DIAGNOSIS — H26.492: ICD-10-CM

## 2025-06-27 DIAGNOSIS — H16.223: ICD-10-CM

## 2025-06-27 DIAGNOSIS — H34.8310: ICD-10-CM

## 2025-06-27 PROCEDURE — 92012 INTRM OPH EXAM EST PATIENT: CPT | Performed by: OPHTHALMOLOGY

## 2025-06-27 PROCEDURE — 92134 CPTRZ OPH DX IMG PST SGM RTA: CPT | Performed by: OPHTHALMOLOGY

## 2025-06-27 ASSESSMENT — REFRACTION_CURRENTRX
OS_SPHERE: +2.00
OS_OVR_VA: 20/
OS_VPRISM_DIRECTION: SV
OS_CYLINDER: SPH
OD_OVR_VA: 20/
OD_CYLINDER: SPH
OD_SPHERE: +2.00
OD_VPRISM_DIRECTION: SV

## 2025-06-27 ASSESSMENT — PACHYMETRY
OS_CT_UM: 566
OS_CT_CORRECTION: -1

## 2025-06-27 ASSESSMENT — REFRACTION_MANIFEST
OS_CYLINDER: SPH
OD_SPHERE: PLANO
OS_CYLINDER: SPH
OD_SPHERE: -1.00
OD_VA1: 20/30+2
OS_VA1: 20/400
OD_CYLINDER: +1.00
OD_AXIS: 165
OS_SPHERE: PLANO
OD_VA1: 20/20
OD_ADD: +2.75
OS_SPHERE: PLANO
OS_ADD: +2.75
OD_CYLINDER: SPH
OS_VA1: 20/150

## 2025-06-27 ASSESSMENT — LID POSITION - PTOSIS
OS_PTOSIS: LUL 1+
OD_PTOSIS: RUL 1+

## 2025-06-27 ASSESSMENT — REFRACTION_AUTOREFRACTION
OS_CYLINDER: -0.25
OS_AXIS: 168
OD_AXIS: 44
OD_SPHERE: PLANO
OD_CYLINDER: -0.25
OS_SPHERE: +0.50

## 2025-06-27 ASSESSMENT — VISUAL ACUITY
OS_BCVA: 20/25
OD_BCVA: 20/400

## 2025-06-27 ASSESSMENT — TONOMETRY
OS_IOP_MMHG: 17
OD_IOP_MMHG: 10

## 2025-06-27 ASSESSMENT — TEAR BREAK UP TIME (TBUT)
OS_TBUT: 2+
OD_TBUT: 2+

## 2025-07-28 ENCOUNTER — RX RENEWAL (OUTPATIENT)
Age: 65
End: 2025-07-28